# Patient Record
Sex: MALE | Race: AMERICAN INDIAN OR ALASKA NATIVE | HISPANIC OR LATINO | URBAN - METROPOLITAN AREA
[De-identification: names, ages, dates, MRNs, and addresses within clinical notes are randomized per-mention and may not be internally consistent; named-entity substitution may affect disease eponyms.]

---

## 2017-04-17 ENCOUNTER — ALLSCRIPTS OFFICE VISIT (OUTPATIENT)
Dept: OTHER | Facility: OTHER | Age: 47
End: 2017-04-17

## 2017-04-17 DIAGNOSIS — I10 ESSENTIAL (PRIMARY) HYPERTENSION: ICD-10-CM

## 2017-04-17 DIAGNOSIS — E29.1 TESTICULAR HYPOFUNCTION: ICD-10-CM

## 2017-04-17 LAB — HBA1C MFR BLD HPLC: 7.3 %

## 2017-10-06 ENCOUNTER — ALLSCRIPTS OFFICE VISIT (OUTPATIENT)
Dept: OTHER | Facility: OTHER | Age: 47
End: 2017-10-06

## 2017-10-06 DIAGNOSIS — M10.9 GOUT: ICD-10-CM

## 2017-10-06 DIAGNOSIS — E78.00 PURE HYPERCHOLESTEROLEMIA: ICD-10-CM

## 2017-10-06 DIAGNOSIS — E11.9 TYPE 2 DIABETES MELLITUS WITHOUT COMPLICATIONS (HCC): ICD-10-CM

## 2017-10-06 LAB
GLUCOSE SERPL-MCNC: 118 MG/DL
HBA1C MFR BLD HPLC: 7.6 %

## 2017-10-07 NOTE — PROGRESS NOTES
Assessment  1  Diabetes mellitus, type 2 (250 00) (E11 9)   2  Hypertension (401 9) (I10)   3  Testicular hypogonadism (257 2) (E29 1)   4  Morbid obesity with BMI of 60 0-69 9, adult (278 01,V85 44) (B49 36,P58 12)    Plan  Diabetes mellitus, type 2    · (1) BASIC METABOLIC PROFILE; Status:Active; Requested for:06Oct2017;    · (1) URINALYSIS (will reflex a microscopy if leukocytes, occult blood, protein or nitrites are  not within normal limits); Status:Active; Requested for:06Oct2017;    · Blood Glucose- POC; Status:Complete;   Done: 31JGO7121 01:23PM  Fasting (Y/N) : No - N   · Hemoglobin A1c- POC; Status:Complete;   Done: 43SQG3709 01:34PM  Gout    · (1) URIC ACID; Status:Active; Requested for:06Oct2017;   Hypercholesterolemia    · (1) LIPID PANEL, FASTING; Status:Active; Requested FFC:[id number]; Testicular hypogonadism    · AndroGel Pump 20 25 MG/ACT (1 62%) Transdermal Gel; APPLY TWO PUMP  PRESSES ONCE a day    Discussion/Summary    A1C here today up to 7 6 ( was 7 9 10/16, 7 3 4/17) He has not picked up Glucometer yet- will get next few days - drop off /Fax readings in 3-4 weeks  Ctr put back on Metformin- extended release- less GI effect/ tolerating - use as is- did not start Januvia - hold that for now  Consider other med- injectable vs Jennifer Michelle   will see Bariatrics as is- working through process to get surgery / improve diet  BP med as is- BP ok  Testosterone gel - hold off on level as we know runs low- restart med  issues w Gout - stay w Allopurinol  Lipids- discussed statin - wishes to discuss next time  re apnea- requesting travel unit/ get supplies as needed  here 2 months  Chief Complaint  DM, htn f/up   Patient is here today for follow up of chronic conditions described in HPI  History of Present Illness  He is in for a follow-up visit, he has been seeing bariatric specialist, now in nutritional program  Has not been checking sugars, never received glucometer   Stopped testosterone gel as ran out  not start Januvia, relates received extended-release metformin from bariatric specialist  issues with gout      Review of Systems    Constitutional: as noted in HPI,-no fever,-not feeling poorly-and-no chills  Cardiovascular: No complaints of slow heart rate, no fast heart rate, no chest pain, no palpitations, no leg claudication, no lower extremity  Respiratory: No complaints of shortness of breath, no wheezing, no cough, no SOB on exertion, no orthopnea or PND  Gastrointestinal: No complaints of abdominal pain, no constipation, no nausea or vomiting, no diarrhea or bloody stools  Genitourinary: no dysuria  Neurological: no tingling  Psychiatric: no anxiety-and-no depression  Hematologic/Lymphatic: No complaints of swollen glands, no swollen glands in the neck, does not bleed easily, no easy bruising  Active Problems  1  Family history of Colon Cancer (V16 0)   2  Diabetes mellitus, type 2 (250 00) (E11 9)   3  Gout (274 9) (M10 9)   4  Hypercholesterolemia (272 0) (E78 00)   5  Hypertension (401 9) (I10)   6  Nephrolithiasis (592 0) (N20 0)   7  Sleep apnea (780 57) (G47 30)   8  Testicular hypogonadism (257 2) (E29 1)    Past Medical History  1  History of Acute UTI (599 0) (N39 0)   2  History of Lightheadedness (780 4) (R42)    The active problems and past medical history were reviewed and updated today  Surgical History  1  History of Complete Colonoscopy   2  History of Complete Colonoscopy   3  History of Kidney Surgery   4  History of Knee Arthroscopy (Therapeutic)    Family History  Father    1  Family history of Colon Cancer (V16 0)  Brother    2  Family history of Diabetes Mellitus (V18 0)    Social History   · Marital History - Currently    · Never A Smoker   · No alcohol use  The social history was reviewed and updated today  Current Meds   1  Allopurinol 300 MG Oral Tablet; take 1 tablet every day;    Therapy: 75Oww6411 to (15 673425)  Requested for: 64FIM6736; Last   ST:68RRG8393 Ordered   2  AndroGel Pump 20 25 MG/ACT (1 62%) Transdermal Gel; APPLY TWO PUMP PRESSES   ONCE a day; Therapy: 93Uhn6283 to (Evaluate:33Rkp0060); Last Rx:19Abd7438 Ordered   3  Lisinopril-Hydrochlorothiazide 10-12 5 MG Oral Tablet; Take 1 tablet daily; Therapy: 84HTD1273 to (Evaluate:86Jwc2158)  Requested for: 45WAT7057; Last   Rx:04Mar2017 Ordered   4  MetFORMIN HCl ER (OSM) 500 MG Oral Tablet Extended Release 24 Hour; TAKE 1   TABLET ONCE DAILY WITH MEAL; Therapy: (Recorded:06Oct2017) to Recorded   5  Multi-Vitamin TABS; TAKE 1 TABLET DAILY; Therapy: (Recorded:27Lef7635) to Recorded    The medication list was reviewed and updated today  Allergies  1  Augmentin TABS    Vitals  Vital Signs    Recorded: 40QKC1168 01:10PM   Heart Rate 68   Systolic 912   Diastolic 76   Height 5 ft 10 in   Weight 454 lb 1 oz   BMI Calculated 65 15   BSA Calculated 2 96     Physical Exam    Constitutional   General appearance: No acute distress, well appearing and well nourished  morbidly obese  Pulmonary   Auscultation of lungs: Clear to auscultation, equal breath sounds bilaterally, no wheezes, no rales, no rhonci  Cardiovascular   Auscultation of heart: Normal rate and rhythm, normal S1 and S2, without murmurs  -No ankle edema  Results/Data  PHQ-2 Adult Depression Screening 05FBD9268 02:17PM User, Ld     Test Name Result Flag Reference   PHQ-2 Adult Depression Score 0     Over the last two weeks, how often have you been bothered by any of the following problems?   Little interest or pleasure in doing things: Not at all - 0  Feeling down, depressed, or hopeless: Not at all - 0   PHQ-2 Adult Depression Screening Negative       Blood Glucose- POC 07MIW4930 01:23PM Madi Hatch     Test Name Result Flag Reference   Glucose Finger Stick 118         Future Appointments    Date/Time Provider Specialty Site   12/07/2017 02:30 PM Madi Hatch, [address]  FAMILY MEDICINE     Signatures   Electronically signed by : Ana Murry DO; Oct  6 2017  5:29PM EST                       (Author)

## 2017-10-09 ENCOUNTER — APPOINTMENT (OUTPATIENT)
Dept: LAB | Facility: CLINIC | Age: 47
End: 2017-10-09
Payer: COMMERCIAL

## 2017-10-09 ENCOUNTER — TRANSCRIBE ORDERS (OUTPATIENT)
Dept: LAB | Facility: CLINIC | Age: 47
End: 2017-10-09

## 2017-10-09 DIAGNOSIS — E78.00 PURE HYPERCHOLESTEROLEMIA: ICD-10-CM

## 2017-10-09 DIAGNOSIS — M10.9 GOUT: ICD-10-CM

## 2017-10-09 DIAGNOSIS — E11.9 TYPE 2 DIABETES MELLITUS WITHOUT COMPLICATIONS (HCC): ICD-10-CM

## 2017-10-09 LAB
ANION GAP SERPL CALCULATED.3IONS-SCNC: 6 MMOL/L (ref 4–13)
BACTERIA UR QL AUTO: ABNORMAL /HPF
BILIRUB UR QL STRIP: NEGATIVE
BUN SERPL-MCNC: 13 MG/DL (ref 5–25)
CALCIUM SERPL-MCNC: 9 MG/DL (ref 8.3–10.1)
CHLORIDE SERPL-SCNC: 106 MMOL/L (ref 100–108)
CHOLEST SERPL-MCNC: 226 MG/DL (ref 50–200)
CLARITY UR: ABNORMAL
CO2 SERPL-SCNC: 26 MMOL/L (ref 21–32)
COLOR UR: ABNORMAL
CREAT SERPL-MCNC: 0.86 MG/DL (ref 0.6–1.3)
GFR SERPL CREATININE-BSD FRML MDRD: 104 ML/MIN/1.73SQ M
GLUCOSE P FAST SERPL-MCNC: 128 MG/DL (ref 65–99)
GLUCOSE UR STRIP-MCNC: NEGATIVE MG/DL
HDLC SERPL-MCNC: 42 MG/DL (ref 40–60)
HGB UR QL STRIP.AUTO: NEGATIVE
KETONES UR STRIP-MCNC: NEGATIVE MG/DL
LDLC SERPL CALC-MCNC: 128 MG/DL (ref 0–100)
LEUKOCYTE ESTERASE UR QL STRIP: ABNORMAL
MUCOUS THREADS UR QL AUTO: ABNORMAL
NITRITE UR QL STRIP: POSITIVE
NON-SQ EPI CELLS URNS QL MICRO: ABNORMAL /HPF
PH UR STRIP.AUTO: 7 [PH] (ref 4.5–8)
POTASSIUM SERPL-SCNC: 3.9 MMOL/L (ref 3.5–5.3)
PROT UR STRIP-MCNC: ABNORMAL MG/DL
RBC #/AREA URNS AUTO: ABNORMAL /HPF
SODIUM SERPL-SCNC: 138 MMOL/L (ref 136–145)
SP GR UR STRIP.AUTO: 1.02 (ref 1–1.03)
TRIGL SERPL-MCNC: 280 MG/DL
URATE SERPL-MCNC: 7.9 MG/DL (ref 4.2–8)
UROBILINOGEN UR QL STRIP.AUTO: 1 E.U./DL
WBC #/AREA URNS AUTO: ABNORMAL /HPF

## 2017-10-09 PROCEDURE — 80061 LIPID PANEL: CPT

## 2017-10-09 PROCEDURE — 80048 BASIC METABOLIC PNL TOTAL CA: CPT

## 2017-10-09 PROCEDURE — 81001 URINALYSIS AUTO W/SCOPE: CPT

## 2017-10-09 PROCEDURE — 84550 ASSAY OF BLOOD/URIC ACID: CPT

## 2017-10-09 PROCEDURE — 36415 COLL VENOUS BLD VENIPUNCTURE: CPT

## 2017-10-10 ENCOUNTER — GENERIC CONVERSION - ENCOUNTER (OUTPATIENT)
Dept: OTHER | Facility: OTHER | Age: 47
End: 2017-10-10

## 2018-01-09 NOTE — RESULT NOTES
Verified Results  (1) URINALYSIS w URINE C/S REFLEX (will reflex a microscopy if leukocytes, occult blood, or nitrites are not within normal limits) 89DTW4108 02:57PM Mouna Deng     Test Name Result Flag Reference   COLOR Dk Yellow     CLARITY Cloudy     PH UA 7 0  4 5-8 0   LEUKOCYTE ESTERASE UA Large A Negative   NITRITE UA Positive A Negative   PROTEIN UA Trace mg/dl A Negative   GLUCOSE UA Negative mg/dl  Negative   KETONES UA Negative mg/dl  Negative   UROBILINOGEN UA 1 0 E U /dl  0 2, 1 0 E U /dl   BILIRUBIN UA Negative  Negative   BLOOD UA Negative  Negative   SPECIFIC GRAVITY UA 1 021  1 003-1 030   BACTERIA Innumerable /hpf A None Seen, Occasional   EPITHELIAL CELLS Occasional /hpf  None Seen, Occasional   RBC UA None Seen /hpf  None Seen   WBC UA Innumerable /hpf A None Seen   CLINICAL REPORT (Report)     Test:        Urine culture  Specimen Type:   Urine  Specimen Date:   11/11/2016 2:57 PM  Result Date:    11/13/2016 11:20 AM  Result Status:   Final result  Resulting Lab:   [address] [address]            Tel: [phone]      CULTURE                                       ------------------                                   >100,000 cfu/ml Klebsiella pneumoniae      SUSCEPTIBILITY                                   ------------------                                                       Klebsiella pneumoniae  METHOD                 YANIRA  -------------------------------------  -------------------------  AMPICILLIN ($$)             >16 00 ug/ml Resistant  AMPICILLIN + SULBACTAM ($)       <=8/4 ug/ml  Susceptible  AZTREONAM ($$$)             <=8 ug/ml   Susceptible  CEFAZOLIN ($)              <=8 00 ug/ml Susceptible  CIPROFLOXACIN ($)            <=1 00 ug/ml Susceptible  GENTAMICIN ($$)             <=4 ug/ml   Susceptible  LEVOFLOXACIN ($)            <=2 00 ug/ml Susceptible  NITROFURANTOIN             <=32 ug/ml  Susceptible  PIPERACILLIN + TAZOBACTAM ($$$)     <=16 ug/ml  Susceptible  TETRACYCLINE              <=4 ug/ml   Susceptible  TOBRAMYCIN ($)             <=4 ug/ml   Susceptible  TRIMETHOPRIM + SULFAMETHOXAZOLE ($$$)  <=2/38 ug/ml Susceptible

## 2018-01-10 NOTE — PROGRESS NOTES
Assessment    1  Encounter for preventive health examination (V70 0) (Z00 00)    Plan  Diabetes mellitus, type 2    · MetFORMIN HCl - 500 MG Oral Tablet   · Januvia 100 MG Oral Tablet; Take 1 tablet daily   · Glucometer; Status:Complete;   Done: 02IGD1426   · Hemoglobin A1c- POC; Status:Complete - Retrospective By Protocol Authorization;    Done: 49QUT9149 04:49PM  FamHx: Colon Cancer, Health Maintenance    · COLONOSCOPY; Status:Active; Requested for:85Lcm4138;   Hypertension    · (1) BASIC METABOLIC PROFILE; Status:Active; Requested for:38Ptx4851;    · (1) URINALYSIS w URINE C/S REFLEX (will reflex a microscopy if leukocytes, occult  blood, or nitrites are not within normal limits); Status:Active; Requested for:48Qbt8468; Testicular hypogonadism    · From  AndroGel Pump 20 25 MG/ACT (1 62%) Transdermal Gel APPLY TWO  PUMP PRESSES ONCE a day x 4 weeks then 4 daily To AndroGel  Pump 20 25 MG/ACT (1 62%) Transdermal Gel APPLY TWO PUMP PRESSES ONCE a  day   · (1) TESTOSTERONE, FREE (DIRECT) AND TOTAL; Status:Active; Requested  for:78Xdp7100;     Discussion/Summary  Impression: health maintenance visit  Testicular cancer screening: self testicular exam technique was taught  Colorectal cancer screening: had age 37 redo now ( FHx Colon Ca w mom)  He was advised to be evaluated by He relates he did see an eye doctor fairly recently  He is in Weight Mngmnt Program at [facility] ( weight there 461 lbs) and plans future Bariatric consult if does not lose weight - He had been 'playing phone tag' w [facility]- should set up DM Ed w them  Gets to gym twice a week, plans to increase this  Has GI issues w Metformin - only uses few times a week - switch to Januvia  Get home Glucometer - check up to daily fasting ( goal < 100) or 2 hr after eating ( goal < 140/160) Drop off readings 1 month  - A1C today has dropped from 7 9 to 7 3  Stay w BP med as is    S/p tx UTI- redo UA - appears passed stone  Also - has been using Testost gel 4 x a week - redo BW Adjust as needed  Future statin    Recheck here 3-4 months Await readings 1 month       Chief Complaint  Physical      History of Present Illness  HPI: He is in today for a checkup, since I had seen him he did start metformin but is only utilizing it few days a week as it upsets his stomach  He is now exploring bariatric program at [facility], is in weight management program at present, starting to exercise  Did not receive glucometer due to some mixup, has not done diabetic education as was playing phone tag with them  Is using testosterone gel 4 days a week 2 pumps  Review of Systems    Constitutional: as noted in HPI, no fever and no chills  ENT: no earache and no sore throat  Cardiovascular: the heart rate was not slow, no chest pain, the heart rate was not fast, no palpitations and no extremity edema  Respiratory: no shortness of breath and no wheezing  Gastrointestinal: No change in bowel, but no abdominal pain, no nausea, no vomiting and no blood in stools  Genitourinary: Previous treatment UTI, feels passed the stone, but no dysuria  Musculoskeletal: no arthralgias  Integumentary: no skin lesions  Neurological: no headache, no confusion, no dizziness, no convulsions and no fainting  Psychiatric: no anxiety and no depression  Endocrine: no hot flashes  Hematologic/Lymphatic: no swollen glands, no tendency for easy bleeding, no tendency for easy bruising and no swollen glands in the neck  Active Problems    1  Family history of Colon Cancer (V16 0)   2  Diabetes mellitus, type 2 (250 00) (E11 9)   3  Gout (274 9) (M10 9)   4  Hypercholesterolemia (272 0) (E78 00)   5  Hypertension (401 9) (I10)   6  Nephrolithiasis (592 0) (N20 0)   7  Obesity (278 00) (E66 9)   8  Sleep apnea (780 57) (G47 30)   9   Testicular hypogonadism (257 2) (E29 1)    Past Medical History    · History of Acute UTI (599 0) (N39 0)   · History of Lightheadedness (780 4) (R42)    Surgical History    · History of Complete Colonoscopy   · History of Complete Colonoscopy   · History of Kidney Surgery   · History of Knee Arthroscopy (Therapeutic)    Family History  Father    · Family history of Colon Cancer (V16 0)  Brother    · Family history of Diabetes Mellitus (V18 0)    Social History    · Marital History - Currently    · Never A Smoker   · No alcohol use    Current Meds   1  Allopurinol 300 MG Oral Tablet; take 1 tablet every day; Therapy: 94Dtw8559 to ([place])  Requested for: 12AAZ2614; Last   Rx:04Mar2017 Ordered   2  AndroGel Pump 20 25 MG/ACT (1 62%) Transdermal Gel; APPLY TWO PUMP PRESSES   ONCE a day x 4 weeks then 4 daily; Therapy: 11Sep2012 to (Evaluate:12Yjh4459); Last Rx:27May2016 Ordered   3  Lisinopril-Hydrochlorothiazide 10-12 5 MG Oral Tablet; Take 1 tablet daily; Therapy: 00NVT7027 to (Evaluate:87Nhg0490)  Requested for: 23XHI3123; Last   Rx:04Mar2017 Ordered   4  MetFORMIN HCl - 500 MG Oral Tablet; TAKE 1 TABLET DAILY WITH LARGEST MEAL; Therapy: 53EIA9295 to (Evaluate:11Znf2083); Last Rx:27May2016 Ordered   5  Multi-Vitamin TABS; TAKE 1 TABLET DAILY; Therapy: (Recorded:42Juq0087) to Recorded    Allergies    1  Augmentin TABS    Vitals   Recorded: 67Oyl9588 04:00PM   Heart Rate 96   Systolic 097   Diastolic 76   Height 5 ft 10 in   Weight 461 lb    BMI Calculated 66 15   BSA Calculated 2 98     Physical Exam    Constitutional   General appearance: No acute distress, well appearing and well nourished  morbidly obese  Head and Face   Head and face: Normal     Eyes   Conjunctiva and lids: No erythema, swelling or discharge  Ears, Nose, Mouth, and Throat   Oropharynx: Normal with no erythema, edema, exudate or lesions  Neck   Neck: Supple, symmetric, trachea midline, no masses  Thyroid: Normal, no thyromegaly  Pulmonary   Auscultation of lungs: Clear to auscultation      Cardiovascular   Auscultation of heart: Normal rate and rhythm, normal S1 and S2, no murmurs  Carotid pulses: 2+ bilaterally  No ankle edema  Lymphatic   Palpation of lymph nodes in neck: No lymphadenopathy  Musculoskeletal   Gait and station: Normal     Neurologic   Cortical function: Normal mental status  Coordination: Normal finger to nose and heel to shin  Psychiatric   Judgment and insight: Normal     Orientation to person, place and time: Normal     Recent and remote memory: Intact  Mood and affect: Normal        Results/Data  Hemoglobin A1c- POC 06Shy3122 04:49PM Araceli Jamison     Test Name Result Flag Reference   HEMOGLOBIN A1C 7 3         Future Appointments    Date/Time Provider Specialty Site   08/18/2017 09:00 AM Araceli Jamison DO Family Medicine [address] FAM[facility]     Signatures   Electronically signed by : Thais Ramos DO;  Apr 17 2017  5:15PM EST                       (Author)

## 2018-01-12 NOTE — RESULT NOTES
Verified Results  (1) COMPREHENSIVE METABOLIC PANEL 05AKJ2859 73:39LQ [facility] Order Number: [id number]     Test Name Result Flag Reference   GLUCOSE,RANDM 126 mg/dL     If the patient is fasting, the ADA then defines impaired fasting glucose as > 100 mg/dL and diabetes as > or equal to 123 mg/dL  SODIUM 140 mmol/L  136-145   POTASSIUM 4 2 mmol/L  3 5-5 3   CHLORIDE 106 mmol/L  100-108   CARBON DIOXIDE 28 mmol/L  21-32   ANION GAP (CALC) 6 mmol/L  4-13   BLOOD UREA NITROGEN 12 mg/dL  5-25   CREATININE 0 87 mg/dL  0 60-1 30   Standardized to IDMS reference method   CALCIUM 9 0 mg/dL  8 3-10 1   BILI, TOTAL 0 60 mg/dL  0 20-1 00   ALK PHOSPHATAS 101 U/L     ALT (SGPT) 18 U/L  12-78   AST(SGOT) 9 U/L  5-45   ALBUMIN 3 6 g/dL  3 5-5 0   TOTAL PROTEIN 7 6 g/dL  6 4-8 2   eGFR Non-African American      >60 0 ml/min/1 73sq [facility] Disease Education Program recommendations are as follows:  GFR calculation is accurate only with a steady state creatinine  Chronic Kidney disease less than 60 ml/min/1 73 sq  meters  Kidney failure less than 15 ml/min/1 73 sq  meters  (1) HEMOGLOBIN A1C 10Oct2016 12:47PM [facility] Order Number: [id number]     Test Name Result Flag Reference   HEMOGLOBIN A1C 7 9 % H 4 2-6 3   EST  AVG   GLUCOSE 180 mg/dl       (1) CBC/PLT/DIFF 10Oct2016 12:47PM [facility] Order Number: [id number]     Test Name Result Flag Reference   WBC COUNT 8 32 Thousand/uL  4 31-10 16   RBC COUNT 4 68 Million/uL  3 88-5 62   HEMOGLOBIN 13 2 g/dL  12 0-17 0   HEMATOCRIT 40 5 %  36 5-49 3   MCV 87 fL  82-98   MCH 28 2 pg  26 8-34 3   MCHC 32 6 g/dL  31 4-37 4   RDW 14 4 %  11 6-15 1   MPV 11 5 fL  8 9-12 7   PLATELET COUNT 699 Thousands/uL  149-390   nRBC AUTOMATED 0 /100 WBCs     NEUTROPHILS RELATIVE PERCENT 66 %  43-75   LYMPHOCYTES RELATIVE PERCENT 24 %  14-44   MONOCYTES RELATIVE PERCENT 8 %  4-12   EOSINOPHILS RELATIVE PERCENT 2 %  0-6 BASOPHILS RELATIVE PERCENT 0 %  0-1   NEUTROPHILS ABSOLUTE COUNT 5 40 Thousands/?L  1 85-7 62   LYMPHOCYTES ABSOLUTE COUNT 2 00 Thousands/?L  0 60-4 47   MONOCYTES ABSOLUTE COUNT 0 66 Thousand/?L  0 17-1 22   EOSINOPHILS ABSOLUTE COUNT 0 18 Thousand/?L  0 00-0 61   BASOPHILS ABSOLUTE COUNT 0 03 Thousands/?L  0 00-0 10     (1) TESTOSTERONE, FREE (DIRECT) AND TOTAL 10Oct2016 12:47PM Gloria Mata Order Number: [id number]     Test Name Result Flag Reference   FREE TESTOSTERONE, DIRECT 5 8 pg/mL L 6 8 - 21 5   COMMENT Comment     Adult male reference interval is based on a population of lean males  up to 36years old     TESTOSTERONE (TOTAL) 102 ng/dL L 348 - 6866   Performed at:  [facility] [address]  [id number]  : Amber Payan MD, Phone:  [phone]

## 2018-01-12 NOTE — RESULT NOTES
Verified Results  (1) BASIC METABOLIC PROFILE 91FGM5097 10:33AM [facility] Order Number: [id number]     Test Name Result Flag Reference   SODIUM 138 mmol/L  136-145   POTASSIUM 3 9 mmol/L  3 5-5 3   CHLORIDE 106 mmol/L  100-108   CARBON DIOXIDE 26 mmol/L  21-32   ANION GAP (CALC) 6 mmol/L  4-13   BLOOD UREA NITROGEN 13 mg/dL  5-25   CREATININE 0 86 mg/dL  0 60-1 30   Standardized to IDMS reference method   CALCIUM 9 0 mg/dL  8 3-10 1   eGFR 104 ml/min/1 73sq m     National Kidney Disease Education Program recommendations are as follows:  GFR calculation is accurate only with a steady state creatinine  Chronic Kidney disease less than 60 ml/min/1 73 sq  meters  Kidney failure less than 15 ml/min/1 73 sq  meters  GLUCOSE FASTING 128 mg/dL H 65-99   Specimen collection should occur prior to Sulfasalazine administration due to the potential for falsely depressed results  Specimen collection should occur prior to Sulfapyridine administration due to the potential for falsely elevated results       (1) URINALYSIS (will reflex a microscopy if leukocytes, occult blood, protein or nitrites are not within normal limits) 09Oct2017 10:33AM [facility] Order Number: [id number]     Test Name Result Flag Reference   COLOR Dk Yellow     CLARITY Cloudy     SPECIFIC GRAVITY UA 1 025  1 003-1 030   PH UA 7 0  4 5-8 0   LEUKOCYTE ESTERASE UA Moderate A Negative   NITRITE UA Positive A Negative   PROTEIN UA Trace mg/dl A Negative   GLUCOSE UA Negative mg/dl  Negative   KETONES UA Negative mg/dl  Negative   UROBILINOGEN UA 1 0 E U /dl  0 2, 1 0 E U /dl   BILIRUBIN UA Negative  Negative   BLOOD UA Negative  Negative   BACTERIA Innumerable /hpf A None Seen, Occasional   EPITHELIAL CELLS None Seen /hpf  None Seen, Occasional   MUCOUS THREADS Occasional  Occasional, Moderate, Innumerable   RBC UA None Seen /hpf  None Seen, 0-5   WBC UA 20-30 /hpf A None Seen, 0-5, 5-55, 5-65     (1) URIC ACID 09Oct2017 10: 33AM [facility] Order Number: [id number]     Test Name Result Flag Reference   URIC ACID 7 9 mg/dL  4 2-8 0   Specimen collection should occur prior to Metamizole administration due to the potential for falsely depressed results  (1) LIPID PANEL, FASTING 08QMH0164 10:33AM [facility] Order Number: [id number]     Test Name Result Flag Reference   CHOLESTEROL 226 mg/dL H    HDL,DIRECT 42 mg/dL  40-60   Specimen collection should occur prior to Metamizole administration due to the potential for falsley depressed results  LDL CHOLESTEROL CALCULATED 128 mg/dL H 0-100   Triglyceride:        Normal <150 mg/dl   Borderline High 150-199 mg/dl   High 200-499 mg/dl   Very High >499 mg/dl      Cholesterol:       Desirable <200 mg/dl    Borderline High 200-239 mg/dl    High >239 mg/dl      HDL Cholesterol:       High>59 mg/dL    Low <41 mg/dL      This screening LDL is a calculated result  It does not have the accuracy of the Direct Measured LDL in the monitoring of patients with hyperlipidemia and/or statin therapy  Direct Measure LDL (LQX613) must be ordered separately in these patients  TRIGLYCERIDES 280 mg/dL H <=150   Specimen collection should occur prior to N-Acetylcysteine or Metamizole administration due to the potential for falsely depressed results

## 2018-01-14 VITALS
DIASTOLIC BLOOD PRESSURE: 76 MMHG | BODY MASS INDEX: 45.1 KG/M2 | WEIGHT: 315 LBS | SYSTOLIC BLOOD PRESSURE: 134 MMHG | HEIGHT: 70 IN | HEART RATE: 96 BPM

## 2018-01-15 VITALS
WEIGHT: 315 LBS | BODY MASS INDEX: 45.1 KG/M2 | SYSTOLIC BLOOD PRESSURE: 138 MMHG | HEIGHT: 70 IN | DIASTOLIC BLOOD PRESSURE: 76 MMHG | HEART RATE: 68 BPM

## 2018-01-16 NOTE — RESULT NOTES
Verified Results  (1) URIC ACID [id number] 09:51AM Ciashopifer Gram Order Number: [id number]     Test Name Result Flag Reference   URIC ACID 7 5 mg/dL  4 2-8 0     (1) COMPREHENSIVE METABOLIC PANEL [id number] 21:72AQ Ciashopifer Gram Order Number: [id number]      National Kidney Disease Education Program recommendations are as follows:  GFR calculation is accurate only with a steady state creatinine  Chronic Kidney disease less than 60 ml/min/1 73 sq  meters  Kidney failure less than 15 ml/min/1 73 sq  meters  Test Name Result Flag Reference   GLUCOSE,RANDM 128 mg/dL     SODIUM 139 mmol/L  136-145   POTASSIUM 4 0 mmol/L  3 5-5 3   CHLORIDE 103 mmol/L  100-108   CARBON DIOXIDE 31 mmol/L  21-32   ANION GAP (CALC) 5 mmol/L  4-13   BLOOD UREA NITROGEN 14 mg/dL  5-25   CREATININE 0 87 mg/dL  0 60-1 30   Standardized to IDMS reference method   CALCIUM 8 9 mg/dL  8 3-10 1   BILI, TOTAL 0 52 mg/dL  0 20-1 00   ALK PHOSPHATAS 107 U/L     ALT (SGPT) 21 U/L  12-78   AST(SGOT) 11 U/L  5-45   ALBUMIN 3 8 g/dL  3 5-5 0   TOTAL PROTEIN 7 6 g/dL  6 4-8 2   eGFR Non-African American      >60 0 ml/min/1 73sq m     (1) HEMOGLOBIN A1C [id number] 09:51AM Janifer Gram Order Number: [id number]      5 7-6 4% impaired fasting glucose  >=6 5% diagnosis of diabetes    Falsely low levels are seen in conditions linked to short RBC life span-  hemolytic anemia, and splenomegaly  Falsely elevated levels are seen in situations where there is an increased production of RBC- receipt of erythropoietin or blood transfusions  Adopted from ADA-Clinical Practice Recommendations     Test Name Result Flag Reference   HEMOGLOBIN A1C 7 0 % H 4 0-5 6   EST  AVG   GLUCOSE 154 mg/dl       (1) CBC/ PLT (NO DIFF) [id number] 09:51AM Ciashopifer Gram Order Number: [id number]     Order Number: [id number]     Test Name Result Flag Reference   HEMATOCRIT 41 8 %  36 5-49 3   HEMOGLOBIN 13 7 g/dL  12 0-17 0   MCHC 32 8 g/dL  31 4-37 4 MCH 28 6 pg  26 8-34 3   MCV 87 fL  82-98   PLATELET COUNT 918 Thousands/uL  149-390   RBC COUNT 4 79 Million/uL  3 88-5 62   RDW 14 1 %  11 6-15 1   WBC COUNT 7 94 Thousand/uL  4 31-10 16   MPV 11 7 fL  8 9-12 7     (1) LIPID PANEL, FASTING [id number] 09:51AM Areatha Pear Order Number: [id number]      Triglyceride:         Normal              <150 mg/dl       Borderline High    150-199 mg/dl       High               200-499 mg/dl       Very High          >499 mg/dl  Cholesterol:         Desirable        <200 mg/dl      Borderline High  200-239 mg/dl      High             >239 mg/dl  HDL Cholesterol:        High    >59 mg/dL      Low     <41 mg/dL  LDL CALCULATED:    This screening LDL is a calculated result  It does not have the accuracy of the Direct Measured LDL in the monitoring of patients with hyperlipidemia and/or statin therapy  Direct Measure LDL (BBE212) must be ordered separately in these patients  Test Name Result Flag Reference   CHOLESTEROL 242 mg/dL H    HDL,DIRECT 43 mg/dL  40-60   LDL CHOLESTEROL CALCULATED 155 mg/dL H 0-100   TRIGLYCERIDES 220 mg/dL H <=150     (1) TESTOSTERONE, FREE (DIRECT) AND TOTAL [id number] 09:51AM Areatha Pear Order Number: [id number]    Performed at:  [address]  [id number]  : Rosangela Ruiz MD, Phone:  [phone]     Test Name Result Flag Reference   FREE TESTOSTERONE, DIRECT 7 4 pg/mL  6 8 - 21 5   COMMENT Comment     Adult male reference interval is based on a population of lean malesup to 36years old     TESTOSTERONE (TOTAL) 97 ng/dL L 348 - 1197

## 2018-03-05 ENCOUNTER — TRANSCRIBE ORDERS (OUTPATIENT)
Dept: LAB | Facility: CLINIC | Age: 48
End: 2018-03-05

## 2018-03-06 DIAGNOSIS — I10 ESSENTIAL HYPERTENSION: Primary | ICD-10-CM

## 2018-03-06 DIAGNOSIS — M1A.00X0 IDIOPATHIC CHRONIC GOUT WITHOUT TOPHUS, UNSPECIFIED SITE: ICD-10-CM

## 2018-03-06 RX ORDER — ALLOPURINOL 300 MG/1
TABLET ORAL
Qty: 90 TABLET | Refills: 0 | Status: SHIPPED | OUTPATIENT
Start: 2018-03-06 | End: 2018-06-11 | Stop reason: SDUPTHER

## 2018-03-06 RX ORDER — LISINOPRIL AND HYDROCHLOROTHIAZIDE 12.5; 1 MG/1; MG/1
TABLET ORAL
Qty: 90 TABLET | Refills: 0 | Status: SHIPPED | OUTPATIENT
Start: 2018-03-06 | End: 2018-12-28 | Stop reason: SDUPTHER

## 2018-03-15 RX ORDER — TESTOSTERONE 16.2 MG/G
GEL TRANSDERMAL
COMMUNITY
Start: 2012-09-11 | End: 2018-03-16 | Stop reason: SDUPTHER

## 2018-03-15 RX ORDER — METFORMIN HYDROCHLORIDE EXTENDED-RELEASE TABLETS 500 MG/1
TABLET, FILM COATED, EXTENDED RELEASE ORAL
COMMUNITY
End: 2018-03-16 | Stop reason: SDUPTHER

## 2018-03-15 RX ORDER — MULTIVITAMIN
1 TABLET ORAL DAILY
COMMUNITY

## 2018-03-16 ENCOUNTER — OFFICE VISIT (OUTPATIENT)
Dept: FAMILY MEDICINE CLINIC | Facility: CLINIC | Age: 48
End: 2018-03-16
Payer: COMMERCIAL

## 2018-03-16 VITALS
WEIGHT: 315 LBS | OXYGEN SATURATION: 97 % | DIASTOLIC BLOOD PRESSURE: 76 MMHG | BODY MASS INDEX: 42.66 KG/M2 | SYSTOLIC BLOOD PRESSURE: 110 MMHG | HEART RATE: 101 BPM | HEIGHT: 72 IN

## 2018-03-16 DIAGNOSIS — M1A.9XX0 CHRONIC GOUT WITHOUT TOPHUS, UNSPECIFIED CAUSE, UNSPECIFIED SITE: ICD-10-CM

## 2018-03-16 DIAGNOSIS — E11.8 TYPE 2 DIABETES MELLITUS WITH COMPLICATION, UNSPECIFIED LONG TERM INSULIN USE STATUS: Primary | ICD-10-CM

## 2018-03-16 DIAGNOSIS — E29.1 TESTICULAR HYPOGONADISM: ICD-10-CM

## 2018-03-16 DIAGNOSIS — I10 ESSENTIAL HYPERTENSION: ICD-10-CM

## 2018-03-16 DIAGNOSIS — E66.01 MORBID OBESITY WITH BMI OF 60.0-69.9, ADULT (HCC): ICD-10-CM

## 2018-03-16 PROBLEM — Z99.89 OSA ON CPAP: Status: ACTIVE | Noted: 2017-06-14

## 2018-03-16 PROBLEM — G47.33 OSA ON CPAP: Status: ACTIVE | Noted: 2017-06-14

## 2018-03-16 LAB — SL AMB POCT HEMOGLOBIN AIC: 7.4

## 2018-03-16 PROCEDURE — 99214 OFFICE O/P EST MOD 30 MIN: CPT | Performed by: FAMILY MEDICINE

## 2018-03-16 PROCEDURE — 83036 HEMOGLOBIN GLYCOSYLATED A1C: CPT | Performed by: FAMILY MEDICINE

## 2018-03-16 RX ORDER — METFORMIN HYDROCHLORIDE EXTENDED-RELEASE TABLETS 500 MG/1
500 TABLET, FILM COATED, EXTENDED RELEASE ORAL 2 TIMES DAILY WITH MEALS
Qty: 180 TABLET | Refills: 1 | Status: SHIPPED | OUTPATIENT
Start: 2018-03-16 | End: 2018-11-26 | Stop reason: SDUPTHER

## 2018-03-16 RX ORDER — TESTOSTERONE 16.2 MG/G
GEL TRANSDERMAL
Qty: 75 G | Refills: 3 | Status: SHIPPED | OUTPATIENT
Start: 2018-03-16 | End: 2018-08-24 | Stop reason: SDUPTHER

## 2018-03-16 NOTE — LETTER
March 16, 2018     Patient: Johnny Park   YOB: 1970   Date of Visit: 3/16/2018       To Whom it May Concern:    Ken Khan is under my professional care  He was seen in my office on 3/16/2018  If you have any questions or concerns, please don't hesitate to call           Sincerely,          Reford DO Rachel        CC: No Recipients

## 2018-03-16 NOTE — PROGRESS NOTES
FAMILY PRACTICE OFFICE VISIT      Lacy ANDRADE O  [facility]    [address], [facility]      NAME: Carey Carrasquillo  AGE: 53 y o  SEX: male  : 1970   MRN: [id number]    DATE: 3/16/2018  TIME: 12:47 PM    Assessment and Plan     Problem List Items Addressed This Visit        Endocrine    Diabetes mellitus, type 2 ([facility] ) - Primary    Relevant Medications    metFORMIN (FORTAMET) 500 MG (OSM) 24 hr tablet    Dapagliflozin Propanediol (FARXIGA) 5 MG TABS    Other Relevant Orders    POCT hemoglobin A1c (Completed)    HEMOGLOBIN A1C W/ EAG ESTIMATION    Testicular hypogonadism    Relevant Medications    testosterone (ANDROGEL PUMP) 1 62 % TD gel pump    Other Relevant Orders    Testosterone, free, total    CBC       Cardiovascular and Mediastinum    Essential hypertension    Relevant Orders    Comprehensive metabolic panel       Other    Gout    Relevant Orders    Uric acid    Morbid obesity with BMI of 60 0-69 9, adult ([facility] )            Patient Instructions   His blood pressure is under good control here today, I would like him to continue on lisinopril hydrochlorothiazide as is  He just received his glucometer, he will start checking his sugar, A1c done here today is slightly improved at 7 4 but not ideal, he will continue to work at [employer], he is continuing through the bariatric center process, he will be following up with them  We will increase his metformin to twice daily, he does use extended-release  He should see an eye doctor every 1-2 years  He does have some numbness right 5th toe, does not appear related to diabetes but I would like him to see Podiatry  We discussed other meds -  Rx Farxiga -     Declines statin  he will restart testosterone supplementation, -     Redo blood work before returns in 4 months  He has had no issues with gout, continue on allopurinol as is      He did have issue with passing small stone recently, call if increased urinary symptoms, none current  Chief Complaint     Chief Complaint   Patient presents with    Follow-up     DM        History of Present Illness     Asad Mackenzie is a 53y o -year-old male who presents today in follow-up, I had seen him in October  He relates he is feeling about the same, has not followed up with bariatric due to insurance issue but does plan to see them  Has not yet started to check his sugars  Is not currently using testosterone but plans to start    Review of Systems     Review of Systems   Constitutional: Positive for unexpected weight change (Weight remains far too high, he continues to struggle with this)  Negative for appetite change, chills, diaphoresis, fatigue and fever  HENT: Negative for congestion, dental problem, ear pain, hearing loss, mouth sores, sinus pain, sinus pressure, sore throat and trouble swallowing  Eyes: Negative for visual disturbance  Respiratory: Negative for cough, chest tightness and shortness of breath  Cardiovascular: Negative for chest pain, palpitations and leg swelling  Gastrointestinal: Negative for abdominal pain, blood in stool, constipation, diarrhea, nausea and vomiting  Genitourinary: Negative for difficulty urinating, dysuria and hematuria  Musculoskeletal: Negative for arthralgias (No issues with gout), back pain and myalgias  Skin: Negative for rash and wound  Neurological: Negative for dizziness, syncope, weakness, light-headedness and headaches  Hematological: Negative for adenopathy  Does not bruise/bleed easily  Psychiatric/Behavioral: Negative for behavioral problems, confusion and sleep disturbance         Active Problem List     Patient Active Problem List   Diagnosis    Testicular hypogonadism    WALDO on CPAP    Nephrolithiasis    Morbid obesity with BMI of 60 0-69 9, adult ([facility] )    Hypercholesterolemia    Gout    Essential hypertension    Diabetes mellitus, type 2 ([facility] )       Past Medical History:  No past medical history on file  Past Surgical History:  Past Surgical History:   Procedure Laterality Date    COLONOSCOPY      Had neg 2011(redo at age 40)   Larry Lemme KIDNEY SURGERY Right     For kidney stones age 7   Larry Lemme KNEE ARTHROSCOPY Left        Family History:  Family History   Problem Relation Age of Onset    Colon cancer Father     Diabetes Brother        Social History:  Social History     Social History    Marital status: /Civil Union     Spouse name: N/A    Number of children: N/A    Years of education: N/A     Occupational History    Not on file  Social History Main Topics    Smoking status: Never Smoker    Smokeless tobacco: Never Used    Alcohol use No    Drug use: Unknown    Sexual activity: Not on file     Other Topics Concern    Not on file     Social History Narrative    No narrative on file       Objective     Vitals:    03/16/18 0926   BP: 110/76   Pulse: 101   SpO2: 97%   Weight: (!) 199 kg (439 lb 9 6 oz)   Height: 5' 11 5" (1 816 m)     Body mass index is 60 46 kg/m²  BP Readings from Last 3 Encounters:   03/16/18 110/76   10/06/17 138/76   04/17/17 134/76       Wt Readings from Last 3 Encounters:   03/16/18 (!) 199 kg (439 lb 9 6 oz)   10/06/17 (!) 206 kg (454 lb 0 9 oz)   04/17/17 (!) 209 kg (460 lb 15 9 oz)       Physical Exam   Constitutional: He is oriented to person, place, and time  No distress  Pleasant morbidly obese male seated no acute distress   HENT:   Mouth/Throat: Oropharynx is clear and moist    TM clear b/l   Eyes: Conjunctivae are normal  Right eye exhibits no discharge  Left eye exhibits no discharge  No scleral icterus  Neck: Normal range of motion  Neck supple  Carotid bruit is not present  No thyromegaly present  Cardiovascular: Normal rate, regular rhythm and normal heart sounds  No murmur heard    No carotid bruit   Pulmonary/Chest: Effort normal and breath sounds normal  No respiratory distress  He has no wheezes  He has no rales  Abdominal: Soft  There is no tenderness  Musculoskeletal: He exhibits no edema  Lymphadenopathy:     He has no cervical adenopathy  Neurological: He is alert and oriented to person, place, and time  No cranial nerve deficit  Coordination normal    Skin: He is not diaphoretic  Psychiatric: He has a normal mood and affect  His behavior is normal  Judgment and thought content normal    Nursing note and vitals reviewed  Pertinent Laboratory/Diagnostic Studies:  Results for orders placed or performed in visit on 03/16/18   POCT hemoglobin A1c   Result Value Ref Range     HGB A1C 7 4      See Oct BW    ALLERGIES:  Allergies   Allergen Reactions    Augmentin  [Amoxicillin-Pot Clavulanate] Diarrhea       Current Medications     Current Outpatient Prescriptions   Medication Sig Dispense Refill    allopurinol (ZYLOPRIM) 300 mg tablet TAKE 1 TABLET EVERY DAY 90 tablet 0    glucose blood (ONE TOUCH ULTRA TEST) test strip by In Vitro route daily      lisinopril-hydrochlorothiazide (PRINZIDE,ZESTORETIC) 10-12 5 MG per tablet TAKE 1 TABLET DAILY 90 tablet 0    metFORMIN (FORTAMET) 500 MG (OSM) 24 hr tablet Take 1 tablet (500 mg total) by mouth 2 (two) times a day with meals 180 tablet 1    Multiple Vitamin (MULTI-VITAMIN DAILY) TABS Take 1 tablet by mouth daily      testosterone (ANDROGEL PUMP) 1 62 % TD gel pump Use 2 pump presses daily 75 g 3    Dapagliflozin Propanediol (FARXIGA) 5 MG TABS Take 1 tablet (5 mg total) by mouth daily 30 tablet 6     No current facility-administered medications for this visit            Health Maintenance     Orders Placed This Encounter   Procedures    Testosterone, free, total    CBC    Comprehensive metabolic panel    HEMOGLOBIN A1C W/ EAG ESTIMATION    Uric acid    POCT hemoglobin A1c         Genet Solis DO

## 2018-03-16 NOTE — PATIENT INSTRUCTIONS
His blood pressure is under good control here today, I would like him to continue on lisinopril hydrochlorothiazide as is  He just received his glucometer, he will start checking his sugar, A1c done here today is slightly improved at 7 4 but not ideal, he will continue to work at [employer], he is continuing through the bariatric center process, he will be following up with them  We will increase his metformin to twice daily, he does use extended-release  He should see an eye doctor every 1-2 years  He does have some numbness right 5th toe, does not appear related to diabetes but I would like him to see Podiatry  We discussed other meds -  Rx Farxiga -     Declines statin  he will restart testosterone supplementation, -     Redo blood work before returns in 4 months  He has had no issues with gout, continue on allopurinol as is  He did have issue with passing small stone recently, call if increased urinary symptoms, none current

## 2018-06-11 DIAGNOSIS — M1A.00X0 IDIOPATHIC CHRONIC GOUT WITHOUT TOPHUS, UNSPECIFIED SITE: ICD-10-CM

## 2018-06-11 RX ORDER — ALLOPURINOL 300 MG/1
TABLET ORAL
Qty: 90 TABLET | Refills: 1 | Status: SHIPPED | OUTPATIENT
Start: 2018-06-11 | End: 2019-06-15 | Stop reason: SDUPTHER

## 2018-08-24 ENCOUNTER — OFFICE VISIT (OUTPATIENT)
Dept: FAMILY MEDICINE CLINIC | Facility: CLINIC | Age: 48
End: 2018-08-24
Payer: COMMERCIAL

## 2018-08-24 VITALS
BODY MASS INDEX: 42.66 KG/M2 | HEIGHT: 72 IN | DIASTOLIC BLOOD PRESSURE: 76 MMHG | HEART RATE: 90 BPM | WEIGHT: 315 LBS | SYSTOLIC BLOOD PRESSURE: 138 MMHG | OXYGEN SATURATION: 95 %

## 2018-08-24 DIAGNOSIS — E29.1 TESTICULAR HYPOGONADISM: ICD-10-CM

## 2018-08-24 DIAGNOSIS — G47.33 OSA ON CPAP: ICD-10-CM

## 2018-08-24 DIAGNOSIS — E11.65 TYPE 2 DIABETES MELLITUS WITH HYPERGLYCEMIA, WITHOUT LONG-TERM CURRENT USE OF INSULIN (HCC): ICD-10-CM

## 2018-08-24 DIAGNOSIS — M10.9 GOUT, UNSPECIFIED CAUSE, UNSPECIFIED CHRONICITY, UNSPECIFIED SITE: ICD-10-CM

## 2018-08-24 DIAGNOSIS — Z00.00 WELL ADULT HEALTH CHECK: Primary | ICD-10-CM

## 2018-08-24 DIAGNOSIS — Z99.89 OSA ON CPAP: ICD-10-CM

## 2018-08-24 DIAGNOSIS — E78.2 MIXED HYPERLIPIDEMIA: ICD-10-CM

## 2018-08-24 DIAGNOSIS — E66.01 MORBID OBESITY WITH BMI OF 60.0-69.9, ADULT (HCC): ICD-10-CM

## 2018-08-24 DIAGNOSIS — I10 ESSENTIAL HYPERTENSION: ICD-10-CM

## 2018-08-24 PROBLEM — M17.12 OSTEOARTHRITIS OF LEFT KNEE: Status: ACTIVE | Noted: 2018-08-24

## 2018-08-24 LAB — SL AMB POCT HEMOGLOBIN AIC: 7.6

## 2018-08-24 PROCEDURE — 99396 PREV VISIT EST AGE 40-64: CPT | Performed by: FAMILY MEDICINE

## 2018-08-24 PROCEDURE — 83036 HEMOGLOBIN GLYCOSYLATED A1C: CPT | Performed by: FAMILY MEDICINE

## 2018-08-24 RX ORDER — TESTOSTERONE 16.2 MG/G
GEL TRANSDERMAL
Qty: 75 G | Refills: 0 | Status: SHIPPED | OUTPATIENT
Start: 2018-08-24 | End: 2019-09-05 | Stop reason: SDUPTHER

## 2018-08-24 RX ORDER — TESTOSTERONE 16.2 MG/G
GEL TRANSDERMAL
Qty: 75 G | Refills: 1 | Status: SHIPPED | OUTPATIENT
Start: 2018-08-24 | End: 2018-08-24 | Stop reason: SDUPTHER

## 2018-08-24 NOTE — PATIENT INSTRUCTIONS
He is in today for a physical exam/ regular check  He is proceeding along with plan to undergo bariatric sleeve in December with Dr Tammy Goddard  Very good candidate ( wife/ daughter underwent bypass)   He will continue to work at [employer], continue work at [employer] loss in the meantime  Left knee osteoarthritis does limit his exercise, he has been seeing [facility] regarding this, will require future replacement  His sugars are still too high, redo A1c here today is about the same at 7 6, he has not been checking home glucometer readings, try to check at least few times a week for guidance  Has not been using metformin twice daily, he will increase to twice daily with meals  I also want him to increase Farxiga to 10 mg daily  No issues with gout, continue on allopurinol as is, redo uric acid  Had not done blood work after last visit, slip given to redo fasting urinalysis along with blood work here today  He does continue on testosterone supplementation, recently using this about 3 times weekly  Include PSA, CBC with blood work  Blood pressure is acceptable, continue on lisinopril HCT 10/12 5 daily  Immunization History   Administered Date(s) Administered    Influenza 02/15/2016    Influenza Quadrivalent Preservative Free 3 years and older IM 09/26/2014    Influenza Quadrivalent, 6-35 Months IM 02/15/2016    Influenza TIV (IM) 10/11/2013    Tuberculin Skin Test-PPD Intradermal 04/01/2011       He does not do yearly Flu shot  Do in Oct   Tdap/tetanus shot will be done at a future date  (done every 10 yrs for superficial cuts, every 5 yrs for deep wounds)      Was never a smoker     Regarding Colon Cancer screening, we discussed Colonoscopy vs ColoGuard is indicated starting at age 48  last was age 37 -   has FHx Colon Cancer ( mom)  Plans to redo age 49  Discussed Prostate Cancer screening pros/cons starting at age 48    PSA blood test  ordered  Should do monthly testicular exam at home  We did review previous blood work,   He is not up to date with Lipid screening  Not on statin  Wishes to hold off  He is up to date with Diabetes screening       Also - should see Dentist routinely, and also should see Eye Dr if any vision changes      We will see him again in 3-4 months ( before surgery) -  sooner as needed

## 2018-08-24 NOTE — PROGRESS NOTES
FAMILY PRACTICE OFFICE VISIT  [place] ANDRADE O  [facility]  [address], [address]      NAME: Barry Purcell  AGE: 53 y o  SEX: male  : 1970   MRN: [id number]    DATE: 2018  TIME: 12:18 PM    Assessment and Plan     Problem List Items Addressed This Visit        Endocrine    Testicular hypogonadism    Relevant Medications    testosterone (ANDROGEL PUMP) 1 62 % TD gel pump    Other Relevant Orders    POCT hemoglobin A1c (Completed)    Testosterone, free, total    CBC    Comprehensive metabolic panel    TSH, 3rd generation with Free T4 reflex    Urinalysis with microscopic    PSA, Total Screen    Type 2 diabetes mellitus with hyperglycemia, without long-term current use of insulin (HCC)    Relevant Medications    Dapagliflozin Propanediol (FARXIGA) 10 MG TABS    Other Relevant Orders    Comprehensive metabolic panel    Lipid panel    Urinalysis with microscopic       Respiratory    WALDO on CPAP       Cardiovascular and Mediastinum    Essential hypertension    Relevant Orders    Comprehensive metabolic panel    Lipid panel    Urinalysis with microscopic       Other    Gout    Relevant Orders    Uric acid    Mixed hyperlipidemia    Relevant Orders    Comprehensive metabolic panel    Lipid panel    Morbid obesity with BMI of 60 0-69 9, adult (HCC)    Relevant Orders    Comprehensive metabolic panel    Lipid panel    TSH, 3rd generation with Free T4 reflex      Other Visit Diagnoses     Well adult health check    -  Primary          Patient Instructions     He is in today for a physical exam/ regular check  He is proceeding along with plan to undergo bariatric sleeve in December with Dr Izabella Corado  Very good candidate ( wife/ daughter underwent bypass)   He will continue to work at [employer], continue work at [employer] loss in the meantime    Left knee osteoarthritis does limit his exercise, he has been seeing [facility] regarding this, will require future replacement  His sugars are still too high, redo A1c here today is about the same at 7 6, he has not been checking home glucometer readings, try to check at least few times a week for guidance  Has not been using metformin twice daily, he will increase to twice daily with meals  I also want him to increase Farxiga to 10 mg daily  No issues with gout, continue on allopurinol as is, redo uric acid  Had not done blood work after last visit, slip given to redo fasting urinalysis along with blood work here today  He does continue on testosterone supplementation, recently using this about 3 times weekly  Include PSA, CBC with blood work  Blood pressure is acceptable, continue on lisinopril HCT 10/12 5 daily  Immunization History   Administered Date(s) Administered    Influenza 02/15/2016    Influenza Quadrivalent Preservative Free 3 years and older IM 09/26/2014    Influenza Quadrivalent, 6-35 Months IM 02/15/2016    Influenza TIV (IM) 10/11/2013    Tuberculin Skin Test-PPD Intradermal 04/01/2011       He does not do yearly Flu shot  Do in Oct   Tdap/tetanus shot will be done at a future date  (done every 10 yrs for superficial cuts, every 5 yrs for deep wounds)      Was never a smoker     Regarding Colon Cancer screening, we discussed Colonoscopy vs ColoGuard is indicated starting at age 48  last was age 37 -   has FHx Colon Cancer ( mom)  Plans to redo age 49  Discussed Prostate Cancer screening pros/cons starting at age 48  PSA blood test  ordered  Should do monthly testicular exam at home  We did review previous blood work,   He is not up to date with Lipid screening  Not on statin  Wishes to hold off  He is up to date with Diabetes screening       Also - should see Dentist routinely, and also should see Eye Dr if any vision changes      We will see him again in 3-4 months ( before surgery) -  sooner as needed              Chief Complaint     Chief Complaint   Patient presents with    Follow-up       History of Present Illness   Barry Purcell is a 53y o -year-old male who is in for a follow-up visit/regular physical   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  He has not been checking sugars  He is pursuing bariatric gastric sleeve, plans to do in December  He has been using his testosterone 3 days weekly  Has only been using metformin once daily  Review of Systems   Review of Systems   Constitutional: Negative for appetite change, fatigue, fever and unexpected weight change  HENT: Negative for sore throat and trouble swallowing  Eyes: Negative for visual disturbance  Respiratory: Negative for cough, chest tightness and shortness of breath  Cardiovascular: Negative for chest pain, palpitations and leg swelling  Gastrointestinal: Negative for abdominal pain and blood in stool  No acid reflux     No change in bowel   Genitourinary: Negative for dysuria and hematuria  Musculoskeletal: Positive for arthralgias (Seeing orthopedist regarding knee pain)  Neurological: Negative for dizziness, seizures, syncope, light-headedness and headaches  Hematological: Does not bruise/bleed easily  Psychiatric/Behavioral: Negative for behavioral problems and confusion  Active Problem List     Patient Active Problem List   Diagnosis    Testicular hypogonadism    WALDO on CPAP    Nephrolithiasis    Morbid obesity with BMI of 60 0-69 9, adult ([address] )    Mixed hyperlipidemia    Gout    Essential hypertension    Type 2 diabetes mellitus with hyperglycemia, without long-term current use of insulin (HCC)    Osteoarthritis of left knee       Past Medical History:  No past medical history on file      Past Surgical History:  Past Surgical History:   Procedure Laterality Date    COLONOSCOPY      Had neg 2011(redo at age 40)   [place] KIDNEY SURGERY Right     For kidney stones age 7   [place] KNEE ARTHROSCOPY Left        Family History:  Family History   Problem Relation Age of Onset    Colon cancer Father     Diabetes Brother        Social History:  Social History     Social History    Marital status: /Civil Union     Spouse name: N/A    Number of children: N/A    Years of education: N/A     Occupational History    Not on file  Social History Main Topics    Smoking status: Never Smoker    Smokeless tobacco: Never Used    Alcohol use 0 6 oz/week     1 Shots of liquor per week      Comment: weekly     Drug use: No    Sexual activity: Not on file     Other Topics Concern    Not on file     Social History Narrative    No narrative on file       Objective     Vitals:    08/24/18 0926   BP: 138/76   Pulse: 90   SpO2: 95%   Weight: (!) 199 kg (439 lb)   Height: 5' 11 5" (1 816 m)     Body mass index is 60 37 kg/m²  BP Readings from Last 3 Encounters:   08/24/18 138/76   03/16/18 110/76   10/06/17 138/76       Wt Readings from Last 3 Encounters:   08/24/18 (!) 199 kg (439 lb)   03/16/18 (!) 199 kg (439 lb 9 6 oz)   10/06/17 (!) 206 kg (454 lb 0 9 oz)       Physical Exam   Constitutional: He is oriented to person, place, and time  No distress  Pleasant morbidly obese male   HENT:   TM clear b/l   Eyes: Conjunctivae are normal  No scleral icterus  Neck: Neck supple  Carotid bruit is not present  Cardiovascular: Normal rate, regular rhythm and normal heart sounds  No murmur heard  No carotid bruit   Pulmonary/Chest: Effort normal and breath sounds normal  No respiratory distress  He has no wheezes  He has no rales  Abdominal: Soft  There is no tenderness  Musculoskeletal: He exhibits no edema  Lymphadenopathy:     He has no cervical adenopathy  Neurological: He is alert and oriented to person, place, and time  No cranial nerve deficit  Psychiatric: He has a normal mood and affect   His behavior is normal    Nursing note and vitals reviewed  ALLERGIES:  Allergies   Allergen Reactions    Augmentin  [Amoxicillin-Pot Clavulanate] Diarrhea       Current Medications     Current Outpatient Prescriptions   Medication Sig Dispense Refill    allopurinol (ZYLOPRIM) 300 mg tablet TAKE 1 TABLET BY MOUTH EVERY DAY 90 tablet 1    Dapagliflozin Propanediol (FARXIGA) 10 MG TABS Take 1 tablet (10 mg total) by mouth daily 90 tablet 1    glucose blood (ONE TOUCH ULTRA TEST) test strip by In Vitro route daily      lisinopril-hydrochlorothiazide (PRINZIDE,ZESTORETIC) 10-12 5 MG per tablet TAKE 1 TABLET DAILY 90 tablet 0    metFORMIN (FORTAMET) 500 MG (OSM) 24 hr tablet Take 1 tablet (500 mg total) by mouth 2 (two) times a day with meals 180 tablet 1    Multiple Vitamin (MULTI-VITAMIN DAILY) TABS Take 1 tablet by mouth daily      testosterone (ANDROGEL PUMP) 1 62 % TD gel pump Use 2 pump presses daily 75 g 0     No current facility-administered medications for this visit                Most recent labs available from [address] [place] [address]   ( others may be available in [facility] / Media sections)  Lab Results   Component Value Date    WBC 8 32 10/10/2016    HGB 13 2 10/10/2016    HCT 40 5 10/10/2016     10/10/2016    CHOL 208 10/11/2013    TRIG 280 (H) 10/09/2017    HDL 42 10/09/2017    ALT 18 10/10/2016    AST 9 10/10/2016     10/09/2017    K 3 9 10/09/2017     10/09/2017    CREATININE 0 86 10/09/2017    BUN 13 10/09/2017    CO2 26 10/09/2017    GLUF 128 (H) 10/09/2017    HGBA1C 7 6 08/24/2018     Lab Results   Component Value Date    LDLCALC 128 (H) 10/09/2017     Lab Results   Component Value Date    [id number] 2 175 10/11/2013         Orders Placed This Encounter   Procedures    Testosterone, free, total    CBC    Comprehensive metabolic panel    Lipid panel    Uric acid    TSH, 3rd generation with Free T4 reflex    Urinalysis with microscopic    PSA, Total Screen    POCT hemoglobin A1c         Elisabeth Dumont DO

## 2018-08-31 ENCOUNTER — APPOINTMENT (OUTPATIENT)
Dept: LAB | Facility: CLINIC | Age: 48
End: 2018-08-31
Payer: COMMERCIAL

## 2018-08-31 DIAGNOSIS — E29.1 TESTICULAR HYPOGONADISM: ICD-10-CM

## 2018-08-31 DIAGNOSIS — E66.01 MORBID OBESITY (HCC): Primary | ICD-10-CM

## 2018-08-31 DIAGNOSIS — E11.69 DIABETES MELLITUS ASSOCIATED WITH HORMONAL ETIOLOGY (HCC): ICD-10-CM

## 2018-08-31 DIAGNOSIS — I10 ESSENTIAL HYPERTENSION, MALIGNANT: ICD-10-CM

## 2018-08-31 DIAGNOSIS — E66.9 OBESITY, UNSPECIFIED CLASSIFICATION, UNSPECIFIED OBESITY TYPE, UNSPECIFIED WHETHER SERIOUS COMORBIDITY PRESENT: ICD-10-CM

## 2018-08-31 LAB
25(OH)D3 SERPL-MCNC: 12.6 NG/ML (ref 30–100)
ALBUMIN SERPL BCP-MCNC: 3.8 G/DL (ref 3.5–5)
ALP SERPL-CCNC: 96 U/L (ref 46–116)
ALT SERPL W P-5'-P-CCNC: 24 U/L (ref 12–78)
ANION GAP SERPL CALCULATED.3IONS-SCNC: 7 MMOL/L (ref 4–13)
AST SERPL W P-5'-P-CCNC: 16 U/L (ref 5–45)
BACTERIA UR QL AUTO: ABNORMAL /HPF
BASOPHILS # BLD AUTO: 0.03 THOUSANDS/ΜL (ref 0–0.1)
BASOPHILS NFR BLD AUTO: 0 % (ref 0–1)
BILIRUB SERPL-MCNC: 1.04 MG/DL (ref 0.2–1)
BILIRUB UR QL STRIP: NEGATIVE
BUN SERPL-MCNC: 19 MG/DL (ref 5–25)
CALCIUM SERPL-MCNC: 9.1 MG/DL (ref 8.3–10.1)
CHLORIDE SERPL-SCNC: 102 MMOL/L (ref 100–108)
CHOLEST SERPL-MCNC: 231 MG/DL (ref 50–200)
CLARITY UR: ABNORMAL
CO2 SERPL-SCNC: 28 MMOL/L (ref 21–32)
COLOR UR: ABNORMAL
CREAT SERPL-MCNC: 0.83 MG/DL (ref 0.6–1.3)
EOSINOPHIL # BLD AUTO: 0.14 THOUSAND/ΜL (ref 0–0.61)
EOSINOPHIL NFR BLD AUTO: 2 % (ref 0–6)
ERYTHROCYTE [DISTWIDTH] IN BLOOD BY AUTOMATED COUNT: 14.1 % (ref 11.6–15.1)
EST. AVERAGE GLUCOSE BLD GHB EST-MCNC: 160 MG/DL
FERRITIN SERPL-MCNC: 180 NG/ML (ref 8–388)
FOLATE SERPL-MCNC: >20 NG/ML (ref 3.1–17.5)
GFR SERPL CREATININE-BSD FRML MDRD: 105 ML/MIN/1.73SQ M
GLUCOSE P FAST SERPL-MCNC: 118 MG/DL (ref 65–99)
GLUCOSE UR STRIP-MCNC: NEGATIVE MG/DL
HBA1C MFR BLD: 7.2 % (ref 4.2–6.3)
HCT VFR BLD AUTO: 42.4 % (ref 36.5–49.3)
HDLC SERPL-MCNC: 38 MG/DL (ref 40–60)
HGB BLD-MCNC: 13.3 G/DL (ref 12–17)
HGB UR QL STRIP.AUTO: NEGATIVE
HYALINE CASTS #/AREA URNS LPF: ABNORMAL /LPF
IMM GRANULOCYTES # BLD AUTO: 0.04 THOUSAND/UL (ref 0–0.2)
IMM GRANULOCYTES NFR BLD AUTO: 1 % (ref 0–2)
KETONES UR STRIP-MCNC: NEGATIVE MG/DL
LDLC SERPL CALC-MCNC: 154 MG/DL (ref 0–100)
LEUKOCYTE ESTERASE UR QL STRIP: ABNORMAL
LYMPHOCYTES # BLD AUTO: 1.84 THOUSANDS/ΜL (ref 0.6–4.47)
LYMPHOCYTES NFR BLD AUTO: 27 % (ref 14–44)
MCH RBC QN AUTO: 27.8 PG (ref 26.8–34.3)
MCHC RBC AUTO-ENTMCNC: 31.4 G/DL (ref 31.4–37.4)
MCV RBC AUTO: 89 FL (ref 82–98)
MONOCYTES # BLD AUTO: 0.65 THOUSAND/ΜL (ref 0.17–1.22)
MONOCYTES NFR BLD AUTO: 10 % (ref 4–12)
NEUTROPHILS # BLD AUTO: 4.09 THOUSANDS/ΜL (ref 1.85–7.62)
NEUTS SEG NFR BLD AUTO: 60 % (ref 43–75)
NITRITE UR QL STRIP: POSITIVE
NON-SQ EPI CELLS URNS QL MICRO: ABNORMAL /HPF
NONHDLC SERPL-MCNC: 193 MG/DL
NRBC BLD AUTO-RTO: 0 /100 WBCS
PH UR STRIP.AUTO: 6 [PH] (ref 4.5–8)
PLATELET # BLD AUTO: 292 THOUSANDS/UL (ref 149–390)
PMV BLD AUTO: 11.5 FL (ref 8.9–12.7)
POTASSIUM SERPL-SCNC: 3.9 MMOL/L (ref 3.5–5.3)
PROT SERPL-MCNC: 8.3 G/DL (ref 6.4–8.2)
PROT UR STRIP-MCNC: ABNORMAL MG/DL
PSA SERPL-MCNC: 0.7 NG/ML (ref 0–4)
RBC # BLD AUTO: 4.78 MILLION/UL (ref 3.88–5.62)
RBC #/AREA URNS AUTO: ABNORMAL /HPF
SODIUM SERPL-SCNC: 137 MMOL/L (ref 136–145)
SP GR UR STRIP.AUTO: 1.03 (ref 1–1.03)
TIBC SERPL-MCNC: 320 UG/DL (ref 250–450)
TRIGL SERPL-MCNC: 194 MG/DL
TSH SERPL DL<=0.05 MIU/L-ACNC: 1.49 UIU/ML (ref 0.36–3.74)
URATE SERPL-MCNC: 6.5 MG/DL (ref 4.2–8)
UROBILINOGEN UR QL STRIP.AUTO: 1 E.U./DL
VIT B12 SERPL-MCNC: 572 PG/ML (ref 100–900)
WBC # BLD AUTO: 6.79 THOUSAND/UL (ref 4.31–10.16)
WBC #/AREA URNS AUTO: ABNORMAL /HPF

## 2018-08-31 PROCEDURE — 84402 ASSAY OF FREE TESTOSTERONE: CPT

## 2018-08-31 PROCEDURE — 83036 HEMOGLOBIN GLYCOSYLATED A1C: CPT | Performed by: FAMILY MEDICINE

## 2018-08-31 PROCEDURE — 82728 ASSAY OF FERRITIN: CPT

## 2018-08-31 PROCEDURE — G0103 PSA SCREENING: HCPCS

## 2018-08-31 PROCEDURE — 82607 VITAMIN B-12: CPT

## 2018-08-31 PROCEDURE — 84550 ASSAY OF BLOOD/URIC ACID: CPT | Performed by: FAMILY MEDICINE

## 2018-08-31 PROCEDURE — 85025 COMPLETE CBC W/AUTO DIFF WBC: CPT

## 2018-08-31 PROCEDURE — 80053 COMPREHEN METABOLIC PANEL: CPT | Performed by: FAMILY MEDICINE

## 2018-08-31 PROCEDURE — 3045F PR MOST RECENT HEMOGLOBIN A1C LEVEL 7.0-9.0%: CPT | Performed by: FAMILY MEDICINE

## 2018-08-31 PROCEDURE — 80061 LIPID PANEL: CPT | Performed by: FAMILY MEDICINE

## 2018-08-31 PROCEDURE — 82306 VITAMIN D 25 HYDROXY: CPT

## 2018-08-31 PROCEDURE — 84403 ASSAY OF TOTAL TESTOSTERONE: CPT

## 2018-08-31 PROCEDURE — 81001 URINALYSIS AUTO W/SCOPE: CPT | Performed by: FAMILY MEDICINE

## 2018-08-31 PROCEDURE — 82746 ASSAY OF FOLIC ACID SERUM: CPT

## 2018-08-31 PROCEDURE — 36415 COLL VENOUS BLD VENIPUNCTURE: CPT | Performed by: FAMILY MEDICINE

## 2018-08-31 PROCEDURE — 83550 IRON BINDING TEST: CPT

## 2018-08-31 PROCEDURE — 84443 ASSAY THYROID STIM HORMONE: CPT | Performed by: FAMILY MEDICINE

## 2018-09-01 LAB
TESTOST FREE SERPL-MCNC: 5.1 PG/ML (ref 6.8–21.5)
TESTOST SERPL-MCNC: 122 NG/DL (ref 264–916)

## 2018-09-03 RX ORDER — TESTOSTERONE CYPIONATE 200 MG/ML
300 INJECTION INTRAMUSCULAR
COMMUNITY
End: 2018-12-28 | Stop reason: ALTCHOICE

## 2018-09-03 RX ORDER — DAPAGLIFLOZIN 5 MG/1
1 TABLET, FILM COATED ORAL DAILY
COMMUNITY
Start: 2018-09-03 | End: 2018-12-18 | Stop reason: SDUPTHER

## 2018-09-06 PROBLEM — E55.9 VITAMIN D DEFICIENCY: Status: ACTIVE | Noted: 2018-09-06

## 2018-11-26 ENCOUNTER — OFFICE VISIT (OUTPATIENT)
Dept: FAMILY MEDICINE CLINIC | Facility: CLINIC | Age: 48
End: 2018-11-26
Payer: COMMERCIAL

## 2018-11-26 VITALS
HEART RATE: 88 BPM | OXYGEN SATURATION: 97 % | BODY MASS INDEX: 60.37 KG/M2 | DIASTOLIC BLOOD PRESSURE: 76 MMHG | HEIGHT: 72 IN | SYSTOLIC BLOOD PRESSURE: 138 MMHG

## 2018-11-26 DIAGNOSIS — E11.65 TYPE 2 DIABETES MELLITUS WITH HYPERGLYCEMIA, WITHOUT LONG-TERM CURRENT USE OF INSULIN (HCC): ICD-10-CM

## 2018-11-26 DIAGNOSIS — E66.01 MORBID OBESITY WITH BMI OF 60.0-69.9, ADULT (HCC): ICD-10-CM

## 2018-11-26 DIAGNOSIS — Z99.89 OSA ON CPAP: ICD-10-CM

## 2018-11-26 DIAGNOSIS — I10 ESSENTIAL HYPERTENSION: ICD-10-CM

## 2018-11-26 DIAGNOSIS — E29.1 TESTICULAR HYPOGONADISM: ICD-10-CM

## 2018-11-26 DIAGNOSIS — G47.33 OSA ON CPAP: ICD-10-CM

## 2018-11-26 DIAGNOSIS — E78.2 MIXED HYPERLIPIDEMIA: ICD-10-CM

## 2018-11-26 DIAGNOSIS — Z00.00 WELL ADULT HEALTH CHECK: Primary | ICD-10-CM

## 2018-11-26 DIAGNOSIS — M1A.9XX0 CHRONIC GOUT WITHOUT TOPHUS, UNSPECIFIED CAUSE, UNSPECIFIED SITE: ICD-10-CM

## 2018-11-26 PROCEDURE — 99396 PREV VISIT EST AGE 40-64: CPT | Performed by: FAMILY MEDICINE

## 2018-11-26 RX ORDER — ERGOCALCIFEROL 1.25 MG/1
50000 CAPSULE ORAL WEEKLY
Refills: 0 | COMMUNITY
Start: 2018-10-29 | End: 2019-04-21 | Stop reason: ALTCHOICE

## 2018-11-26 RX ORDER — METFORMIN HYDROCHLORIDE EXTENDED-RELEASE TABLETS 500 MG/1
500 TABLET, FILM COATED, EXTENDED RELEASE ORAL
Qty: 90 TABLET | Refills: 0
Start: 2018-11-26 | End: 2018-12-28 | Stop reason: ALTCHOICE

## 2018-11-26 NOTE — PROGRESS NOTES
FAMILY PRACTICE OFFICE VISIT  Laura Sees A  L[place] ANDRADE O  [facility]  [address], [address]      NAME: Asad Mackenzie  AGE: 53 y o  SEX: male  : 1970   MRN: [id number]    DATE: 2018  TIME: 10:23 AM    Assessment and Plan     Problem List Items Addressed This Visit        Unprioritized    Type 2 diabetes mellitus with hyperglycemia, without long-term current use of insulin (HCC)    Relevant Medications    metFORMIN (FORTAMET) 500 MG (OSM) 24 hr tablet    Testicular hypogonadism    WALDO on CPAP    Morbid obesity with BMI of 60 0-69 9, adult ([facility] [address] )    Mixed hyperlipidemia    Gout    Essential hypertension      Other Visit Diagnoses     Well adult health check    -  Primary          Patient Instructions     He is in today for a physical exam/ regular check  Immunization History   Administered Date(s) Administered    Influenza 02/15/2016    Influenza Quadrivalent Preservative Free 3 years and older IM 2014    Influenza Quadrivalent, 6-35 Months IM 02/15/2016    Influenza TIV (IM) 10/11/2013    Tuberculin Skin Test-PPD Intradermal 2011       He does not do yearly Flu shot  Felt reacted to it -   Should reconsider  Tdap/tetanus shot will be done at a future date  (done every 10 yrs for superficial cuts, every 5 yrs for deep wounds)      Was never a smoker     Regarding Colon Cancer screening,     Has fam hx colon cancer ( Dad age 52)     Had negative scope age 37  He should discuss w Surgeon redo scope ( will be doing EGD today )    We did review previous blood work, he will be doing pre-op testing via [address] -   As long as testing is stable he is medically cleared for 18 gastric sleeve at [facility]  He is up to date with Lipid screening  Has declined statin -   Few mo ago Chol 231/38/154    Re diabetes-   Last A1C 7 2   (18)    He will monitor w Bariatrics and we will see him post procedure  Stay on Farxiga 5 mg daily/metformin 500 mg as is ( only on Metformin once daily )    BP acceptable -  stay on lisinopril HCT 10/12 5 daily  Testosterone level back in [facility], he will continue on testosterone pump 2 presses daily, plan to redo testosterone level few months  PSA in August 0 7, urinalysis at that time trace protein, no blood  Past history gout, previous uric acid 6 5, continue on allopurinol 300 mg daily  Stay on vitamin-D weekly via bariatric  We will see him again after procedure  , sooner as needed            Chief Complaint     Chief Complaint   Patient presents with    Follow-up     3-4 month check up        History of Present Illness   Johnny Park is a 53y o -year-old male who is in today for a follow-up visit along with regular physical, he will be undergoing gastric sleeve December 21st with [facility]/Dr Boswell  He has upcoming EGD  Blood work back in August showed vitamin-D low at 12, he is on weekly 50,000  Uses testosterone 2 pumps daily 5 times a week  Last testosterone level 122 back in August     Home sugars = still not checking at home -    Last A1C 7 2 8/31 -   Never increased to 10 mg Farxiga      Review of Systems   Review of Systems   Constitutional: Positive for fatigue  Negative for appetite change, fever and unexpected weight change  HENT: Negative for sore throat and trouble swallowing  Eyes: Negative for visual disturbance  Respiratory: Negative for cough, chest tightness and shortness of breath  Cardiovascular: Negative for chest pain, palpitations and leg swelling  Gastrointestinal: Negative for abdominal pain, blood in stool, nausea and vomiting  No acid reflux     No change in bowel   Genitourinary: Negative for dysuria and hematuria  Skin: Negative for wound  Neurological: Negative for dizziness, syncope, light-headedness and headaches  Hematological: Does not bruise/bleed easily  Psychiatric/Behavioral: Negative for behavioral problems and confusion  Active Problem List     Patient Active Problem List   Diagnosis    Testicular hypogonadism    WALDO on CPAP    Nephrolithiasis    Morbid obesity with BMI of 60 0-69 9, adult ([address] )    Mixed hyperlipidemia    Gout    Essential hypertension    Type 2 diabetes mellitus with hyperglycemia, without long-term current use of insulin (HCC)    Osteoarthritis of left knee    Vitamin D deficiency       Past Medical History:  No past medical history on file  Past Surgical History:  Past Surgical History:   Procedure Laterality Date    COLONOSCOPY      Had neg 2011(redo at age 40)   [place] KIDNEY SURGERY Right     For kidney stones age 7   [place] KNEE ARTHROSCOPY Left        Family History:  Family History   Problem Relation Age of Onset    Colon cancer Father     Diabetes Brother        Social History:  Social History     Social History    Marital status: /Civil Union     Spouse name: N/A    Number of children: N/A    Years of education: N/A     Occupational History    Not on file  Social History Main Topics    Smoking status: Never Smoker    Smokeless tobacco: Never Used    Alcohol use 0 6 oz/week     1 Shots of liquor per week      Comment: weekly     Drug use: No    Sexual activity: Not on file     Other Topics Concern    Not on file     Social History Narrative    No narrative on file       Objective     Vitals:    11/26/18 0747   BP: 138/76   BP Location: Left arm   Patient Position: Standing   Cuff Size: Extra-Large   Pulse: 88   SpO2: 97%   Height: 5' 11 5" (1 816 m)     Body mass index is 60 37 kg/m²      BP Readings from Last 3 Encounters:   11/26/18 138/76   08/24/18 138/76   03/16/18 110/76       Wt Readings from Last 3 Encounters:   08/24/18 (!) 199 kg (439 lb)   03/16/18 (!) 199 kg (439 lb 9 6 oz)   10/06/17 (!) 206 kg (454 lb 0 9 oz)       Physical Exam   Constitutional: He is oriented to person, place, and time  No distress  Morbidly obese   HENT:   TM clear   Eyes: Conjunctivae are normal  No scleral icterus  Neck: Neck supple  Cardiovascular: Normal rate, regular rhythm and normal heart sounds  No murmur heard  No carotid bruit   Pulmonary/Chest: Effort normal and breath sounds normal  No respiratory distress  Abdominal: Soft  There is no tenderness  Musculoskeletal: He exhibits no edema  Lymphadenopathy:     He has no cervical adenopathy  Neurological: He is alert and oriented to person, place, and time  Psychiatric: He has a normal mood and affect  His behavior is normal        ALLERGIES:  Allergies   Allergen Reactions    Augmentin  [Amoxicillin-Pot Clavulanate] Diarrhea    Metformin Diarrhea     Severe diarrhea at low dose but tolerates XR dose       Current Medications     Current Outpatient Prescriptions   Medication Sig Dispense Refill    allopurinol (ZYLOPRIM) 300 mg tablet TAKE 1 TABLET BY MOUTH EVERY DAY 90 tablet 1    ergocalciferol (VITAMIN D2) 50,000 units Take 50,000 Units by mouth once a week  0    glucose blood (ONE TOUCH ULTRA TEST) test strip by In Vitro route daily      lisinopril-hydrochlorothiazide (PRINZIDE,ZESTORETIC) 10-12 5 MG per tablet TAKE 1 TABLET DAILY 90 tablet 0    metFORMIN (FORTAMET) 500 MG (OSM) 24 hr tablet Take 1 tablet (500 mg total) by mouth daily with breakfast 90 tablet 0    Multiple Vitamin (MULTI-VITAMIN DAILY) TABS Take 1 tablet by mouth daily      testosterone (ANDROGEL PUMP) 1 62 % TD gel pump Use 2 pump presses daily 75 g 0    testosterone cypionate (DEPO-TESTOSTERONE) 200 mg/mL SOLN 300 mg Every 14 days      FARXIGA 5 MG TABS 1 tablet daily       No current facility-administered medications for this visit                Most recent labs available from [address] [place] [address]   ( others may be available in [facility] / Media sections)  Lab Results   Component Value Date    WBC 6 79 08/31/2018    HGB 13 3 08/31/2018    HCT 42 4 08/31/2018  08/31/2018    CHOL 208 10/11/2013    TRIG 194 (H) 08/31/2018    HDL 38 (L) 08/31/2018    ALT 24 08/31/2018    AST 16 08/31/2018     10/11/2013    K 3 9 08/31/2018     08/31/2018    CREATININE 0 83 08/31/2018    BUN 19 08/31/2018    CO2 28 08/31/2018    PSA 0 7 08/31/2018    GLUF 118 (H) 08/31/2018    HGBA1C 7 2 (H) 08/31/2018     Lab Results   Component Value Date    LDLCALC 154 (H) 08/31/2018     Lab Results   Component Value Date    [id number] 1 490 08/31/2018         No orders of the defined types were placed in this encounter          Ghanshyam Key DO

## 2018-11-26 NOTE — PATIENT INSTRUCTIONS
He is in today for a physical exam/ regular check  Immunization History   Administered Date(s) Administered    Influenza 02/15/2016    Influenza Quadrivalent Preservative Free 3 years and older IM 09/26/2014    Influenza Quadrivalent, 6-35 Months IM 02/15/2016    Influenza TIV (IM) 10/11/2013    Tuberculin Skin Test-PPD Intradermal 04/01/2011       He does not do yearly Flu shot  Felt reacted to it -   Should reconsider  Tdap/tetanus shot will be done at a future date  (done every 10 yrs for superficial cuts, every 5 yrs for deep wounds)      Was never a smoker     Regarding Colon Cancer screening,     Has fam hx colon cancer ( Dad age 52)     Had negative scope age 37  He should discuss w Surgeon redo scope ( will be doing EGD today )    We did review previous blood work, he will be doing pre-op testing via [address] [place] [address] [address] -   As long as testing is stable he is medically cleared for 12/21/18 gastric sleeve at [facility]  He is up to date with Lipid screening  Has declined statin -   Few mo ago Chol 231/38/154  Re diabetes-   Last A1C 7 2   (8/31/18)    He will monitor w Bariatrics and we will see him post procedure  Stay on Farxiga 5 mg daily/metformin 500 mg as is ( only on Metformin once daily )    BP acceptable -  stay on lisinopril HCT 10/12 5 daily  Testosterone level back in [facility], he will continue on testosterone pump 2 presses daily, plan to redo testosterone level few months  PSA in August 0 7, urinalysis at that time trace protein, no blood  Past history gout, previous uric acid 6 5, continue on allopurinol 300 mg daily  Stay on vitamin-D weekly via bariatric  We will see him again after procedure  , sooner as needed

## 2018-12-18 DIAGNOSIS — E11.65 TYPE 2 DIABETES MELLITUS WITH HYPERGLYCEMIA, WITHOUT LONG-TERM CURRENT USE OF INSULIN (HCC): Primary | ICD-10-CM

## 2018-12-18 RX ORDER — DAPAGLIFLOZIN 5 MG/1
TABLET, FILM COATED ORAL
Qty: 30 TABLET | Refills: 6 | Status: SHIPPED | OUTPATIENT
Start: 2018-12-18 | End: 2018-12-28 | Stop reason: ALTCHOICE

## 2018-12-28 ENCOUNTER — OFFICE VISIT (OUTPATIENT)
Dept: FAMILY MEDICINE CLINIC | Facility: CLINIC | Age: 48
End: 2018-12-28
Payer: COMMERCIAL

## 2018-12-28 VITALS
TEMPERATURE: 97.3 F | HEART RATE: 84 BPM | DIASTOLIC BLOOD PRESSURE: 60 MMHG | OXYGEN SATURATION: 96 % | SYSTOLIC BLOOD PRESSURE: 110 MMHG | HEIGHT: 72 IN | BODY MASS INDEX: 42.66 KG/M2 | WEIGHT: 315 LBS

## 2018-12-28 DIAGNOSIS — Z98.84 S/P LAPAROSCOPIC SLEEVE GASTRECTOMY: ICD-10-CM

## 2018-12-28 DIAGNOSIS — E11.9 TYPE 2 DIABETES MELLITUS WITHOUT COMPLICATION, WITHOUT LONG-TERM CURRENT USE OF INSULIN (HCC): Primary | ICD-10-CM

## 2018-12-28 DIAGNOSIS — I10 ESSENTIAL HYPERTENSION: ICD-10-CM

## 2018-12-28 PROCEDURE — 99495 TRANSJ CARE MGMT MOD F2F 14D: CPT | Performed by: FAMILY MEDICINE

## 2018-12-28 RX ORDER — LISINOPRIL AND HYDROCHLOROTHIAZIDE 12.5; 1 MG/1; MG/1
TABLET ORAL
Qty: 90 TABLET | Refills: 0
Start: 2018-12-28 | End: 2019-04-07 | Stop reason: SDUPTHER

## 2018-12-28 RX ORDER — PANTOPRAZOLE SODIUM 40 MG/1
40 TABLET, DELAYED RELEASE ORAL DAILY
COMMUNITY
Start: 2018-12-21 | End: 2020-03-26 | Stop reason: ALTCHOICE

## 2018-12-28 RX ORDER — OXYCODONE HCL 5 MG/5 ML
SOLUTION, ORAL ORAL
COMMUNITY
Start: 2018-12-22 | End: 2018-12-28 | Stop reason: ALTCHOICE

## 2018-12-28 NOTE — PROGRESS NOTES
FAMILY PRACTICE OFFICE VISIT  [facility]  [address], [address]      NAME: Tanja White  AGE: 53 y o  SEX: male  : 1970   MRN: [id number]    DATE: 2018  TIME: 11:59 AM    Assessment and Plan     Problem List Items Addressed This Visit        Unprioritized    Essential hypertension     He will decrease lisinopril HCT 1012 0 5 to 1/2 daily, may need to discontinue, watch for lightheadedness  Relevant Medications    lisinopril-hydrochlorothiazide (PRINZIDE,ZESTORETIC) 10-12 5 MG per tablet    S/P laparoscopic sleeve gastrectomy    Type 2 diabetes mellitus without complication, without long-term current use of insulin ([facility]) - Primary     Nonfasting glucometer here today 108, is down 18 lb since  bariatric sleeve, stay off Farxiga/metformin  Patient Instructions   Nonfasting glucometer here today 108, is down 18 lb since  bariatric sleeve, stay off Farxiga/metformin  He will restart testosterone 1 62% 2 pumps daily  He will decrease lisinopril HCT 1012 0 5 to 1/2 daily, may need to discontinue, watch for lightheadedness  He will be seeing bariatric again    We will see him again in 3 to 4 months  I would also like him to call me with an update regarding blood pressure readings after his visit with bariatric  Chief Complaint     Chief Complaint   Patient presents with    Follow-up     Post op Gastric sleeve        History of Present Illness   Tanja White is a 53y o -year-old male who is in today for re-evaluation after laparoscopic sleeve  at [facility], I had seen him preop  He is doing very well, he did see his bariatric surgeon this week in follow-up, is slowly advancing diet, increasing activity  Has dropped 18 lb already  Regarding medication he is off metformin, off [place]    Does continue on lisinopril HCT No dizziness or lightheadedness  Review of Systems   Review of Systems   Constitutional: Negative for chills and fever  HENT: Negative for sore throat and trouble swallowing  Eyes: Negative for visual disturbance  Respiratory: Negative for cough, chest tightness and shortness of breath  Cardiovascular: Negative for chest pain, palpitations and leg swelling  Gastrointestinal: Negative for abdominal pain, blood in stool, nausea and vomiting  No acid reflux     No change in bowel   Genitourinary: Negative for dysuria and hematuria  Neurological: Negative for dizziness, light-headedness and headaches  Psychiatric/Behavioral: Negative for behavioral problems  Active Problem List     Patient Active Problem List   Diagnosis    Testicular hypogonadism    WALDO on CPAP    Nephrolithiasis    Morbid obesity with BMI of 60 0-69 9, adult ([facility] )    Mixed hyperlipidemia    Gout    Essential hypertension    Type 2 diabetes mellitus without complication, without long-term current use of insulin (HCC)    Osteoarthritis of left knee    Vitamin D deficiency    S/P laparoscopic sleeve gastrectomy       Past Medical History:  No past medical history on file  Past Surgical History:  Past Surgical History:   Procedure Laterality Date    COLONOSCOPY      Had neg 2011(redo at age 40)   [employer] KIDNEY SURGERY Right     For kidney stones age 7   [employer] KNEE ARTHROSCOPY Left        Family History:  Family History   Problem Relation Age of Onset    Colon cancer Father     Diabetes Brother        Social History:  Social History     Social History    Marital status: /Civil Union     Spouse name: N/A    Number of children: N/A    Years of education: N/A     Occupational History    Not on file  Social History Main Topics    Smoking status: Never Smoker    Smokeless tobacco: Never Used    Alcohol use 0 6 oz/week     1 Shots of liquor per week      Comment: weekly     Drug use:  No  Sexual activity: Not on file     Other Topics Concern    Not on file     Social History Narrative    No narrative on file       Objective     Vitals:    12/28/18 0949   BP: 110/60   BP Location: Left arm   Patient Position: Sitting   Cuff Size: Extra-Large   Pulse: 84   Temp: (!) 97 3 °F (36 3 °C)   SpO2: 96%   Weight: (!) 191 kg (420 lb 3 2 oz)   Height: 5' 11 5" (1 816 m)     Body mass index is 57 79 kg/m²  BP Readings from Last 3 Encounters:   12/28/18 110/60   11/26/18 138/76   08/24/18 138/76       Wt Readings from Last 3 Encounters:   12/28/18 (!) 191 kg (420 lb 3 2 oz)   08/24/18 (!) 199 kg (439 lb)   03/16/18 (!) 199 kg (439 lb 9 6 oz)       Physical Exam   Constitutional:   Pleasant obese male seated no acute distress   Eyes: Conjunctivae are normal  No scleral icterus  Cardiovascular: Normal rate, regular rhythm and normal heart sounds  No murmur heard  Pulmonary/Chest: Effort normal and breath sounds normal  No respiratory distress  He has no wheezes  He has no rales  Abdominal: Soft  There is no tenderness  There is no guarding  Musculoskeletal: He exhibits no edema  No calf tenderness   Lymphadenopathy:     He has no cervical adenopathy  Psychiatric: He has a normal mood and affect   His behavior is normal        ALLERGIES:  Allergies   Allergen Reactions    Augmentin  [Amoxicillin-Pot Clavulanate] Diarrhea    Metformin Diarrhea     Severe diarrhea at low dose but tolerates XR dose       Current Medications     Current Outpatient Prescriptions   Medication Sig Dispense Refill    allopurinol (ZYLOPRIM) 300 mg tablet TAKE 1 TABLET BY MOUTH EVERY DAY 90 tablet 1    lisinopril-hydrochlorothiazide (PRINZIDE,ZESTORETIC) 10-12 5 MG per tablet Use 1/2 daily 90 tablet 0    Multiple Vitamin (MULTI-VITAMIN DAILY) TABS Take 1 tablet by mouth daily      pantoprazole (PROTONIX) 40 mg tablet Take 40 mg by mouth daily Postop via bariatrics       testosterone (ANDROGEL PUMP) 1 62 % TD gel pump Use 2 pump presses daily 75 g 0    ergocalciferol (VITAMIN D2) 50,000 units Take 50,000 Units by mouth once a week  0    glucose blood (ONE TOUCH ULTRA TEST) test strip by In Vitro route daily       No current facility-administered medications for this visit  Most recent labs available from [address] [place] [address]   ( others may be available in Care Everywhere / Media sections)  Lab Results   Component Value Date    WBC 6 79 08/31/2018    HGB 13 3 08/31/2018    HCT 42 4 08/31/2018     08/31/2018    CHOL 208 10/11/2013    TRIG 194 (H) 08/31/2018    HDL 38 (L) 08/31/2018    ALT 24 08/31/2018    AST 16 08/31/2018     10/11/2013    K 3 9 08/31/2018     08/31/2018    CREATININE 0 83 08/31/2018    BUN 19 08/31/2018    CO2 28 08/31/2018    PSA 0 7 08/31/2018    GLUF 118 (H) 08/31/2018    HGBA1C 7 2 (H) 08/31/2018     Lab Results   Component Value Date    LDLCALC 154 (H) 08/31/2018     Lab Results   Component Value Date    TOZ6UXVVTPLW 1 490 08/31/2018         No orders of the defined types were placed in this encounter          America Carrillo DO

## 2018-12-28 NOTE — ASSESSMENT & PLAN NOTE
Nonfasting glucometer here today 108, is down 18 lb since December 21st bariatric sleeve, stay off Farxiga/metformin

## 2018-12-28 NOTE — ASSESSMENT & PLAN NOTE
He will decrease lisinopril HCT 10/12 0 5 to 1/2 daily, may need to discontinue, watch for lightheadedness

## 2018-12-28 NOTE — PATIENT INSTRUCTIONS
Nonfasting glucometer here today 108, is down 18 lb since December 21st bariatric sleeve, stay off Farxiga/metformin  He will restart testosterone 1 62% 2 pumps daily  He will decrease lisinopril HCT 10/12 0 5 to 1/2 daily, may need to discontinue, watch for lightheadedness  He will be seeing bariatric again January 21st   We will see him again in 3 to 4 months  I would also like him to call me with an update regarding blood pressure readings after his visit with bariatric

## 2019-04-07 DIAGNOSIS — I10 ESSENTIAL HYPERTENSION: ICD-10-CM

## 2019-04-07 RX ORDER — LISINOPRIL AND HYDROCHLOROTHIAZIDE 12.5; 1 MG/1; MG/1
0.5 TABLET ORAL DAILY
Qty: 45 TABLET | Refills: 1 | Status: SHIPPED | OUTPATIENT
Start: 2019-04-07 | End: 2019-04-11 | Stop reason: SDUPTHER

## 2019-04-11 DIAGNOSIS — I10 ESSENTIAL HYPERTENSION: ICD-10-CM

## 2019-04-11 RX ORDER — LISINOPRIL AND HYDROCHLOROTHIAZIDE 12.5; 1 MG/1; MG/1
0.5 TABLET ORAL DAILY
Qty: 45 TABLET | Refills: 1 | Status: SHIPPED | OUTPATIENT
Start: 2019-04-11 | End: 2019-08-09 | Stop reason: ALTCHOICE

## 2019-06-15 DIAGNOSIS — M1A.00X0 IDIOPATHIC CHRONIC GOUT WITHOUT TOPHUS, UNSPECIFIED SITE: ICD-10-CM

## 2019-06-15 RX ORDER — ALLOPURINOL 300 MG/1
TABLET ORAL
Qty: 90 TABLET | Refills: 3 | Status: SHIPPED | OUTPATIENT
Start: 2019-06-15 | End: 2020-09-11 | Stop reason: SDUPTHER

## 2019-07-02 ENCOUNTER — TELEPHONE (OUTPATIENT)
Dept: FAMILY MEDICINE CLINIC | Facility: CLINIC | Age: 49
End: 2019-07-02

## 2019-07-02 NOTE — TELEPHONE ENCOUNTER
----- Message from Essie Gutierrez sent at 6/26/2019  1:41 PM EDT -----  Regarding: RE: Schedule  Pt has appt w/ dr Ninoska Bloom on 8/15  ----- Message -----  From: Yonny Nuñez MA  Sent: 6/13/2019  11:54 AM EDT  To: Essie Gutierrez  Subject: Schedule                                         Please attempt to call and schedule pt for PE/DM check  Pt no showed last appt   Overdue for a DM check up and labs

## 2019-07-26 ENCOUNTER — TRANSCRIBE ORDERS (OUTPATIENT)
Dept: LAB | Facility: CLINIC | Age: 49
End: 2019-07-26

## 2019-08-14 ENCOUNTER — TRANSCRIBE ORDERS (OUTPATIENT)
Dept: LAB | Facility: CLINIC | Age: 49
End: 2019-08-14

## 2019-08-14 ENCOUNTER — APPOINTMENT (OUTPATIENT)
Dept: LAB | Facility: CLINIC | Age: 49
End: 2019-08-14
Payer: COMMERCIAL

## 2019-08-14 DIAGNOSIS — Z98.84 BARIATRIC SURGERY STATUS: ICD-10-CM

## 2019-08-14 DIAGNOSIS — E55.9 AVITAMINOSIS D: ICD-10-CM

## 2019-08-14 DIAGNOSIS — Z09 FOLLOW-UP EXAMINATION, FOLLOWING OTHER SURGERY: ICD-10-CM

## 2019-08-14 DIAGNOSIS — Z09 FOLLOW-UP EXAMINATION, FOLLOWING OTHER SURGERY: Primary | ICD-10-CM

## 2019-08-14 DIAGNOSIS — K90.2 POSTOPERATIVE BLIND LOOP SYNDROME: ICD-10-CM

## 2019-08-14 DIAGNOSIS — Z90.3 ACQUIRED ABSENCE OF ORGAN, STOMACH: ICD-10-CM

## 2019-08-14 LAB
25(OH)D3 SERPL-MCNC: 27.8 NG/ML (ref 30–100)
ALBUMIN SERPL BCP-MCNC: 4.2 G/DL (ref 3.5–5)
ALP SERPL-CCNC: 99 U/L (ref 46–116)
ALT SERPL W P-5'-P-CCNC: 26 U/L (ref 12–78)
ANION GAP SERPL CALCULATED.3IONS-SCNC: 6 MMOL/L (ref 4–13)
AST SERPL W P-5'-P-CCNC: 18 U/L (ref 5–45)
BASOPHILS # BLD AUTO: 0.03 THOUSANDS/ΜL (ref 0–0.1)
BASOPHILS NFR BLD AUTO: 1 % (ref 0–1)
BILIRUB SERPL-MCNC: 1.02 MG/DL (ref 0.2–1)
BUN SERPL-MCNC: 15 MG/DL (ref 5–25)
CALCIUM SERPL-MCNC: 9.3 MG/DL (ref 8.3–10.1)
CHLORIDE SERPL-SCNC: 109 MMOL/L (ref 100–108)
CO2 SERPL-SCNC: 31 MMOL/L (ref 21–32)
CREAT SERPL-MCNC: 0.84 MG/DL (ref 0.6–1.3)
EOSINOPHIL # BLD AUTO: 0.13 THOUSAND/ΜL (ref 0–0.61)
EOSINOPHIL NFR BLD AUTO: 2 % (ref 0–6)
ERYTHROCYTE [DISTWIDTH] IN BLOOD BY AUTOMATED COUNT: 14.3 % (ref 11.6–15.1)
FERRITIN SERPL-MCNC: 124 NG/ML (ref 8–388)
FOLATE SERPL-MCNC: 10.1 NG/ML (ref 3.1–17.5)
GFR SERPL CREATININE-BSD FRML MDRD: 104 ML/MIN/1.73SQ M
GLUCOSE P FAST SERPL-MCNC: 93 MG/DL (ref 65–99)
HCT VFR BLD AUTO: 42.6 % (ref 36.5–49.3)
HGB BLD-MCNC: 13.3 G/DL (ref 12–17)
IMM GRANULOCYTES # BLD AUTO: 0.02 THOUSAND/UL (ref 0–0.2)
IMM GRANULOCYTES NFR BLD AUTO: 0 % (ref 0–2)
IRON SERPL-MCNC: 70 UG/DL (ref 65–175)
LYMPHOCYTES # BLD AUTO: 1.38 THOUSANDS/ΜL (ref 0.6–4.47)
LYMPHOCYTES NFR BLD AUTO: 23 % (ref 14–44)
MCH RBC QN AUTO: 28.2 PG (ref 26.8–34.3)
MCHC RBC AUTO-ENTMCNC: 31.2 G/DL (ref 31.4–37.4)
MCV RBC AUTO: 90 FL (ref 82–98)
MONOCYTES # BLD AUTO: 0.53 THOUSAND/ΜL (ref 0.17–1.22)
MONOCYTES NFR BLD AUTO: 9 % (ref 4–12)
NEUTROPHILS # BLD AUTO: 4.02 THOUSANDS/ΜL (ref 1.85–7.62)
NEUTS SEG NFR BLD AUTO: 65 % (ref 43–75)
NRBC BLD AUTO-RTO: 0 /100 WBCS
PLATELET # BLD AUTO: 239 THOUSANDS/UL (ref 149–390)
PMV BLD AUTO: 11.9 FL (ref 8.9–12.7)
POTASSIUM SERPL-SCNC: 4 MMOL/L (ref 3.5–5.3)
PROT SERPL-MCNC: 7.5 G/DL (ref 6.4–8.2)
PTH-INTACT SERPL-MCNC: 59.1 PG/ML (ref 18.4–80.1)
RBC # BLD AUTO: 4.72 MILLION/UL (ref 3.88–5.62)
SODIUM SERPL-SCNC: 146 MMOL/L (ref 136–145)
VIT B12 SERPL-MCNC: 1110 PG/ML (ref 100–900)
WBC # BLD AUTO: 6.11 THOUSAND/UL (ref 4.31–10.16)

## 2019-08-14 PROCEDURE — 36415 COLL VENOUS BLD VENIPUNCTURE: CPT

## 2019-08-14 PROCEDURE — 84590 ASSAY OF VITAMIN A: CPT

## 2019-08-14 PROCEDURE — 80053 COMPREHEN METABOLIC PANEL: CPT

## 2019-08-14 PROCEDURE — 82728 ASSAY OF FERRITIN: CPT

## 2019-08-14 PROCEDURE — 82306 VITAMIN D 25 HYDROXY: CPT

## 2019-08-14 PROCEDURE — 85025 COMPLETE CBC W/AUTO DIFF WBC: CPT

## 2019-08-14 PROCEDURE — 82746 ASSAY OF FOLIC ACID SERUM: CPT

## 2019-08-14 PROCEDURE — 83540 ASSAY OF IRON: CPT

## 2019-08-14 PROCEDURE — 82607 VITAMIN B-12: CPT

## 2019-08-14 PROCEDURE — 84630 ASSAY OF ZINC: CPT

## 2019-08-14 PROCEDURE — 83970 ASSAY OF PARATHORMONE: CPT

## 2019-08-14 PROCEDURE — 84425 ASSAY OF VITAMIN B-1: CPT

## 2019-08-16 LAB — ZINC SERPL-MCNC: 88 UG/DL (ref 56–134)

## 2019-08-17 LAB
VIT A SERPL-MCNC: 47.7 UG/DL (ref 20.1–62)
VIT B1 BLD-SCNC: 96.4 NMOL/L (ref 66.5–200)

## 2019-09-05 ENCOUNTER — OFFICE VISIT (OUTPATIENT)
Dept: FAMILY MEDICINE CLINIC | Facility: CLINIC | Age: 49
End: 2019-09-05
Payer: COMMERCIAL

## 2019-09-05 VITALS
BODY MASS INDEX: 42.66 KG/M2 | HEIGHT: 72 IN | DIASTOLIC BLOOD PRESSURE: 76 MMHG | OXYGEN SATURATION: 96 % | WEIGHT: 315 LBS | SYSTOLIC BLOOD PRESSURE: 128 MMHG | HEART RATE: 73 BPM

## 2019-09-05 DIAGNOSIS — Z98.84 S/P LAPAROSCOPIC SLEEVE GASTRECTOMY: ICD-10-CM

## 2019-09-05 DIAGNOSIS — E66.01 MORBID OBESITY WITH BMI OF 50.0-59.9, ADULT (HCC): ICD-10-CM

## 2019-09-05 DIAGNOSIS — Z86.39 HISTORY OF DIABETES MELLITUS: ICD-10-CM

## 2019-09-05 DIAGNOSIS — E78.2 MIXED HYPERLIPIDEMIA: ICD-10-CM

## 2019-09-05 DIAGNOSIS — Z12.5 SCREENING PSA (PROSTATE SPECIFIC ANTIGEN): ICD-10-CM

## 2019-09-05 DIAGNOSIS — E29.1 TESTICULAR HYPOGONADISM: Primary | ICD-10-CM

## 2019-09-05 DIAGNOSIS — Z86.79 HISTORY OF HYPERTENSION: ICD-10-CM

## 2019-09-05 DIAGNOSIS — M1A.9XX0 CHRONIC GOUT WITHOUT TOPHUS, UNSPECIFIED CAUSE, UNSPECIFIED SITE: ICD-10-CM

## 2019-09-05 PROBLEM — I10 ESSENTIAL HYPERTENSION: Status: RESOLVED | Noted: 2017-06-14 | Resolved: 2019-09-05

## 2019-09-05 LAB — SL AMB POCT HEMOGLOBIN AIC: 5.7 (ref ?–6.5)

## 2019-09-05 PROCEDURE — 3044F HG A1C LEVEL LT 7.0%: CPT | Performed by: FAMILY MEDICINE

## 2019-09-05 PROCEDURE — 99214 OFFICE O/P EST MOD 30 MIN: CPT | Performed by: FAMILY MEDICINE

## 2019-09-05 PROCEDURE — 3008F BODY MASS INDEX DOCD: CPT | Performed by: FAMILY MEDICINE

## 2019-09-05 PROCEDURE — 83036 HEMOGLOBIN GLYCOSYLATED A1C: CPT | Performed by: FAMILY MEDICINE

## 2019-09-05 RX ORDER — MELATONIN
1000 DAILY
COMMUNITY
End: 2020-09-17 | Stop reason: SDUPTHER

## 2019-09-05 RX ORDER — TESTOSTERONE 16.2 MG/G
GEL TRANSDERMAL
Qty: 75 G | Refills: 2 | Status: SHIPPED | OUTPATIENT
Start: 2019-09-05 | End: 2020-09-11 | Stop reason: SDUPTHER

## 2019-09-05 RX ORDER — LANOLIN ALCOHOL/MO/W.PET/CERES
1000 CREAM (GRAM) TOPICAL DAILY
COMMUNITY

## 2019-09-05 NOTE — PROGRESS NOTES
FAMILY PRACTICE OFFICE VISIT  [facility]  [address], [address]      NAME: Tesha Rod  AGE: 51 y o  SEX: male  : 1970   MRN: [id number]    DATE: 2019  TIME: 4:21 PM    Assessment and Plan     Problem List Items Addressed This Visit        Endocrine    Testicular hypogonadism - Primary    Relevant Medications    testosterone (ANDROGEL PUMP) 1 62 % TD gel pump    Other Relevant Orders    PSA, Total Screen    Testosterone, free, total    CBC       Other    Gout    Relevant Orders    Uric acid    History of diabetes mellitus    Relevant Orders    POCT hemoglobin A1c (Completed)    History of hypertension-resolved with bariatric procedure    Mixed hyperlipidemia    Relevant Orders    Lipid panel    Comprehensive metabolic panel    Morbid obesity with BMI of 50 0-59 9, adult ([facility] )    Relevant Orders    POCT hemoglobin A1c (Completed)    S/P laparoscopic sleeve gastrectomy      Other Visit Diagnoses     Screening PSA (prostate specific antigen)        Relevant Orders    PSA, Total Screen          Patient Instructions   He continues to do well after 2018 bariatric procedure/sleeve  He will continue to see bariatric group  Blood pressure is fine today off medication, recent glucose 93, redo A1c here today 5 7  He will be restarting testosterone, I did give slip to redo blood work in December with other bariatric blood work, no last PSA 1 year ago 0 7  Has had no issues with gout, continues on allopurinol 300 mg daily, include uric acid with blood work  Also will redo fasting lipids      Recheck here 6 mo w PE      Chief Complaint     Chief Complaint   Patient presents with    Follow-up       History of Present Illness   Tesha Rod is a 51y o -year-old male who is in today for a follow-up visit, he had undergone gastric sleeve back in December, had dropped 67 lb as of his visit last month with bariatric group, was 436 lb preop  Glucose was 93 back in August    Regarding testosterone  -   Has not used as was not avail at [address] to restart      Review of Systems   Review of Systems   Constitutional: Positive for fatigue and unexpected weight change  Negative for appetite change and fever  HENT: Negative for sore throat and trouble swallowing  Respiratory: Negative for cough, chest tightness and shortness of breath  Cardiovascular: Negative for chest pain, palpitations and leg swelling  Gastrointestinal: Negative for abdominal pain, blood in stool, nausea and vomiting  Occ softer   No melena     No GERd-   Is on PPI   Genitourinary: Negative for dysuria and hematuria  Neurological: Negative for dizziness, syncope, light-headedness and headaches  Hematological: Does not bruise/bleed easily  Psychiatric/Behavioral: Negative for behavioral problems and confusion  Active Problem List     Patient Active Problem List   Diagnosis    Testicular hypogonadism    WALDO on CPAP    Nephrolithiasis    Morbid obesity with BMI of 50 0-59 9, adult ([facility] )    Mixed hyperlipidemia    Gout    History of diabetes mellitus    Osteoarthritis of left knee    Vitamin D deficiency    S/P laparoscopic sleeve gastrectomy    History of hypertension-resolved with bariatric procedure       Past Medical History:  Reviewed    Past Surgical History:  Reviewed    Family History:  Reviewed    Social History:  Reviewed    Objective     Vitals:    09/05/19 1529   BP: 128/76   BP Location: Left arm   Patient Position: Sitting   Cuff Size: Large   Pulse: 73   SpO2: 96%   Weight: (!) 169 kg (372 lb 9 6 oz)   Height: 5' 11 5" (1 816 m)     Body mass index is 51 24 kg/m²      BP Readings from Last 3 Encounters:   09/05/19 128/76   12/28/18 110/60   11/26/18 138/76       Wt Readings from Last 3 Encounters:   09/05/19 (!) 169 kg (372 lb 9 6 oz)   12/28/18 (!) 191 kg (420 lb 3 2 oz) 08/24/18 (!) 199 kg (439 lb)       Physical Exam   Constitutional: He is oriented to person, place, and time  No distress  Pleasant obese male   HENT:   TM clear   Eyes: No scleral icterus  Cardiovascular: Normal rate, regular rhythm and normal heart sounds  No murmur heard  No carotid bruit   Pulmonary/Chest: Effort normal and breath sounds normal  No respiratory distress  Abdominal: Soft  There is no tenderness  Musculoskeletal: He exhibits no edema  Lymphadenopathy:     He has no cervical adenopathy  Neurological: He is alert and oriented to person, place, and time  Psychiatric: He has a normal mood and affect  His behavior is normal        ALLERGIES:  Allergies   Allergen Reactions    Augmentin  [Amoxicillin-Pot Clavulanate] Diarrhea    Metformin Diarrhea     Severe diarrhea at low dose but tolerates XR dose       Current Medications     Current Outpatient Medications   Medication Sig Dispense Refill    allopurinol (ZYLOPRIM) 300 mg tablet TAKE 1 TABLET BY MOUTH EVERY DAY 90 tablet 3    B Complex Vitamins (VITAMIN B COMPLEX PO) Take 1 tablet by mouth      Calcium Carb-Cholecalciferol (CALCIUM CARBONATE-VITAMIN D3 PO) Take 1 tablet by mouth      cholecalciferol (VITAMIN D3) 1,000 units tablet Take 1,000 Units by mouth daily      Coenzyme Q10 (COQ10 PO) Take by mouth every other day Liquid      Multiple Vitamin (MULTI-VITAMIN DAILY) TABS Take 1 tablet by mouth daily      pantoprazole (PROTONIX) 40 mg tablet Take 40 mg by mouth daily Postop via bariatrics       testosterone (ANDROGEL PUMP) 1 62 % TD gel pump Use 2 pump presses daily 75 g 2    vitamin B-12 (VITAMIN B-12) 1,000 mcg tablet Take 1,000 mcg by mouth daily       No current facility-administered medications for this visit               Orders Placed This Encounter   Procedures    PSA, Total Screen    Testosterone, free, total    Uric acid    Lipid panel    CBC    Comprehensive metabolic panel    POCT hemoglobin A1c Juan Orozco, DO

## 2019-09-05 NOTE — PATIENT INSTRUCTIONS
He continues to do well after December 2018 bariatric procedure/sleeve  He will continue to see bariatric group  Blood pressure is fine today off medication, recent glucose 93, redo A1c here today 5 7  He will be restarting testosterone, I did give slip to redo blood work in December with other bariatric blood work, no last PSA 1 year ago 0 7  Has had no issues with gout, continues on allopurinol 300 mg daily, include uric acid with blood work  Also will redo fasting lipids      Recheck here 6 mo w PE

## 2020-03-26 ENCOUNTER — TELEMEDICINE (OUTPATIENT)
Dept: FAMILY MEDICINE CLINIC | Facility: CLINIC | Age: 50
End: 2020-03-26
Payer: COMMERCIAL

## 2020-03-26 VITALS — BODY MASS INDEX: 49.23 KG/M2 | WEIGHT: 315 LBS

## 2020-03-26 DIAGNOSIS — E78.2 MIXED HYPERLIPIDEMIA: ICD-10-CM

## 2020-03-26 DIAGNOSIS — Z86.79 HISTORY OF HYPERTENSION: ICD-10-CM

## 2020-03-26 DIAGNOSIS — G47.33 OSA ON CPAP: ICD-10-CM

## 2020-03-26 DIAGNOSIS — Z99.89 OSA ON CPAP: ICD-10-CM

## 2020-03-26 DIAGNOSIS — Z86.39 HISTORY OF DIABETES MELLITUS: ICD-10-CM

## 2020-03-26 DIAGNOSIS — E29.1 TESTICULAR HYPOGONADISM: Primary | ICD-10-CM

## 2020-03-26 DIAGNOSIS — Z98.84 S/P LAPAROSCOPIC SLEEVE GASTRECTOMY: ICD-10-CM

## 2020-03-26 DIAGNOSIS — M1A.9XX0 CHRONIC GOUT WITHOUT TOPHUS, UNSPECIFIED CAUSE, UNSPECIFIED SITE: ICD-10-CM

## 2020-03-26 PROCEDURE — 99214 OFFICE O/P EST MOD 30 MIN: CPT | Performed by: FAMILY MEDICINE

## 2020-03-26 NOTE — PROGRESS NOTES
Virtual Regular Visit    Problem List Items Addressed This Visit        Endocrine    Testicular hypogonadism - Primary       Respiratory    WALDO on CPAP       Other    Gout    History of diabetes mellitus    History of hypertension-resolved with bariatric procedure    Mixed hyperlipidemia    S/P laparoscopic sleeve gastrectomy        Patient Instructions     He appears to be doing well, continue to work at [employer] / weight loss, exercise as tolerated  He has dropped over 100 lb since his December 2018 surgery  He will contact bariatric regarding follow-up in August, should see them at least yearly  They may want to see him every 6 months  Continue on vitamins as is  He should do blood work, if he does not have slip he will call us  I ordered that in September  He will discuss re-doing colonoscopy with his bariatric group, he did have  Colonoscopy 2011 w/ LOS/ Dr Melo Lopez -   Has family history colon cancer/ father  Needs redo colonoscopy  He will continue to monitor blood pressure on occasion at work  We will see him again in 6 months, call us sooner if needed      Subjective  J Carlos Gibbs is a 53 y o  male I had last seen back in September, he relates he has been feeling well, he had seen his bariatric group back in August, is status post December 2018 bariatric surgery  Initial weight 464 lb max, currently at home he relates he is 358 lb, was 372 lb back in September  He is trying to walk for exercise, does have some knee issues, plans future total knee replacement  Remains off blood pressure medication, 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  History diabetes, A1c 5 7 back in September  He is not using testosterone gel, had an issue obtaining it  He did not do blood work yet that was ordered in September  He is using his CPAP unit, had mask refitted, he feels he is sleeping well        He is off PPI, no GI complaints, occasionally loose BM which does not bother him  Continues on allopurinol, 1 episode gout the past year  BMI Counseling: There is no height or weight on file to calculate BMI  The BMI is above normal  Nutrition recommendations include encouraging healthy choices of fruits and vegetables and moderation in carbohydrate intake  Exercise recommendations include exercising 3-5 times per week  Sees Bariatrics -  S/p Dec 2018 Sx          Encounter provider Tuan Mcdaniel DO    Provider located at [address] [place] [address] Dr Galeano [phone]      Recent Visits  No visits were found meeting these conditions  Showing recent visits within past 7 days and meeting all other requirements     Today's Visits  Date Type Provider Dept   03/26/20 Telemedicine Tuan Mcdaniel DO [facility]   Showing today's visits and meeting all other requirements     Future Appointments  No visits were found meeting these conditions  Showing future appointments within next 150 days and meeting all other requirements        After connecting through [employer], the patient was identified by name and date of birth  Peg Jordan was informed that this is a telemedicine visit and that the visit is being conducted through [employer] which may not be secure and therefore, might not be HIPAA-compliant  My office door was closed  No one else was in the room  He acknowledged consent and understanding of privacy and security of the video platform  The patient has agreed to participate and understands they can discontinue the visit at any time        Past Medical History:   Diagnosis Date    Essential hypertension 6/14/2017       Past Surgical History:   Procedure Laterality Date    COLONOSCOPY      Had neg 2011(redo at age 40)   [facility] KIDNEY SURGERY Right     For kidney stones age 7   [facility] KNEE ARTHROSCOPY Left        Current Outpatient Medications   Medication Sig Dispense Refill    allopurinol (ZYLOPRIM) 300 mg tablet TAKE 1 TABLET BY MOUTH EVERY DAY 90 tablet 3    B Complex Vitamins (VITAMIN B COMPLEX PO) Take 1 tablet by mouth      Calcium Carb-Cholecalciferol (CALCIUM CARBONATE-VITAMIN D3 PO) Take 1 tablet by mouth      cholecalciferol (VITAMIN D3) 1,000 units tablet Take 1,000 Units by mouth daily      Coenzyme Q10 (COQ10 PO) Take by mouth every other day Liquid      Multiple Vitamin (MULTI-VITAMIN DAILY) TABS Take 1 tablet by mouth daily      vitamin B-12 (VITAMIN B-12) 1,000 mcg tablet Take 1,000 mcg by mouth daily      testosterone (ANDROGEL PUMP) 1 62 % TD gel pump Use 2 pump presses daily 75 g 2     No current facility-administered medications for this visit  Allergies   Allergen Reactions    Augmentin  [Amoxicillin-Pot Clavulanate] Diarrhea    Metformin Diarrhea     Severe diarrhea at low dose but tolerates XR dose       Review of Systems   Constitutional: Negative for appetite change, fatigue, fever and unexpected weight change  HENT: Negative for sore throat and trouble swallowing  Respiratory: Negative for cough, chest tightness and shortness of breath  Cardiovascular: Negative for chest pain, palpitations and leg swelling  Gastrointestinal: Negative for abdominal pain and blood in stool  No acid reflux     No change in bowel   Genitourinary: Negative for dysuria and hematuria  Neurological: Negative for dizziness, syncope, weakness, light-headedness and headaches  Hematological: Does not bruise/bleed easily  Psychiatric/Behavioral: Negative for behavioral problems and confusion  Wt Readings from Last 3 Encounters:   03/26/20 (!) 162 kg (358 lb)   09/05/19 (!) 169 kg (372 lb 9 6 oz)   12/28/18 (!) 191 kg (420 lb 3 2 oz)     Physical Exam   Constitutional: He is oriented to person, place, and time  He looks well, has no respiratory distress, no chest pain  Neurological: He is alert and oriented to person, place, and time     Psychiatric: He has a normal mood and affect  His behavior is normal         I spent 15 minutes with the patient during this visit

## 2020-03-26 NOTE — PATIENT INSTRUCTIONS
He appears to be doing well, continue to work at [employer] / weight loss, exercise as tolerated  He has dropped over 100 lb since his December 2018 surgery  He will contact bariatric regarding follow-up in August, should see them at least yearly  They may want to see him every 6 months  Continue on vitamins as is  He should do blood work, if he does not have slip he will call us  I ordered that in September  He will discuss re-doing colonoscopy with his bariatric group, he did have  Colonoscopy 2011 w/ LOS/ Dr Shayy Mendez -   Has family history colon cancer/ father  Needs redo colonoscopy  He will continue to monitor blood pressure on occasion at work      We will see him again in 6 months, call us sooner if needed

## 2020-09-11 ENCOUNTER — TELEMEDICINE (OUTPATIENT)
Dept: FAMILY MEDICINE CLINIC | Facility: CLINIC | Age: 50
End: 2020-09-11
Payer: COMMERCIAL

## 2020-09-11 DIAGNOSIS — M1A.00X0 IDIOPATHIC CHRONIC GOUT WITHOUT TOPHUS, UNSPECIFIED SITE: ICD-10-CM

## 2020-09-11 DIAGNOSIS — Z20.828 EXPOSURE TO SARS-ASSOCIATED CORONAVIRUS: Primary | ICD-10-CM

## 2020-09-11 DIAGNOSIS — E29.1 TESTICULAR HYPOGONADISM: ICD-10-CM

## 2020-09-11 DIAGNOSIS — Z20.828 EXPOSURE TO SARS-ASSOCIATED CORONAVIRUS: ICD-10-CM

## 2020-09-11 PROCEDURE — 1036F TOBACCO NON-USER: CPT | Performed by: INTERNAL MEDICINE

## 2020-09-11 PROCEDURE — U0003 INFECTIOUS AGENT DETECTION BY NUCLEIC ACID (DNA OR RNA); SEVERE ACUTE RESPIRATORY SYNDROME CORONAVIRUS 2 (SARS-COV-2) (CORONAVIRUS DISEASE [COVID-19]), AMPLIFIED PROBE TECHNIQUE, MAKING USE OF HIGH THROUGHPUT TECHNOLOGIES AS DESCRIBED BY CMS-2020-01-R: HCPCS | Performed by: INTERNAL MEDICINE

## 2020-09-11 PROCEDURE — 99213 OFFICE O/P EST LOW 20 MIN: CPT | Performed by: INTERNAL MEDICINE

## 2020-09-11 RX ORDER — TESTOSTERONE 16.2 MG/G
GEL TRANSDERMAL
Qty: 75 G | Refills: 2 | Status: SHIPPED | OUTPATIENT
Start: 2020-09-11 | End: 2021-04-15 | Stop reason: SDUPTHER

## 2020-09-11 RX ORDER — ALLOPURINOL 300 MG/1
300 TABLET ORAL DAILY
Qty: 90 TABLET | Refills: 3 | Status: SHIPPED | OUTPATIENT
Start: 2020-09-11 | End: 2021-10-26

## 2020-09-11 NOTE — PROGRESS NOTES
COVID-19 Virtual Visit     Assessment/Plan:    Problem List Items Addressed This Visit     None        This virtual check-in was done via uniRow  Disposition:      I referred Moni Turner to one of our centralized sites for a COVID-19 swab    I spent 15 minutes directly with the patient during this visit    Encounter provider Peyton Canales MD    Provider located at [address] [place] [id number]    Recent Visits  No visits were found meeting these conditions  Showing recent visits within past 7 days and meeting all other requirements     Today's Visits  Date Type Provider Dept   09/11/20 Russ Chun MD [place] [address] [facility]   Showing today's visits and meeting all other requirements     Future Appointments  No visits were found meeting these conditions  Showing future appointments within next 150 days and meeting all other requirements        Patient agrees to participate in a virtual check in via telephone or video visit instead of presenting to the office to address urgent/immediate medical needs  Patient is aware this is a billable service  After connecting through televideo, the patient was identified by name and date of birth  Holly Ricci was informed that this was a telemedicine visit and that the exam was being conducted confidentially over secure lines  My office door was closed  No one else was in the room  Holly Ricci acknowledged consent and understanding of privacy and security of the telemedicine visit  I informed the patient that I have reviewed his record in Epic and presented the opportunity for him to ask any questions regarding the visit today  The patient agreed to participate  Subjective  Holly Ricci is a 53 y o  male who is concerned about COVID-19  He reports chills and cough   He has not experienced shortness of breath, sore throat, myalgias, abdominal pain, diarrhea, nausea and vomiting He has not had contact with a person who is under investigation for or who is positive for COVID-19 within the last 14 days  He has not been hospitalized recently for fever and/or lower respiratory symptoms  Past Medical History:   Diagnosis Date    Essential hypertension 6/14/2017       Past Surgical History:   Procedure Laterality Date    COLONOSCOPY      Had neg 2011(redo at age 40)   [facility] KIDNEY SURGERY Right     For kidney stones age 7   24 [facility] KNEE ARTHROSCOPY Left        Current Outpatient Medications   Medication Sig Dispense Refill    allopurinol (ZYLOPRIM) 300 mg tablet TAKE 1 TABLET BY MOUTH EVERY DAY 90 tablet 3    B Complex Vitamins (VITAMIN B COMPLEX PO) Take 1 tablet by mouth      Calcium Carb-Cholecalciferol (CALCIUM CARBONATE-VITAMIN D3 PO) Take 1 tablet by mouth      cholecalciferol (VITAMIN D3) 1,000 units tablet Take 1,000 Units by mouth daily      Coenzyme Q10 (COQ10 PO) Take by mouth every other day Liquid      Multiple Vitamin (MULTI-VITAMIN DAILY) TABS Take 1 tablet by mouth daily      testosterone (ANDROGEL PUMP) 1 62 % TD gel pump Use 2 pump presses daily 75 g 2    vitamin B-12 (VITAMIN B-12) 1,000 mcg tablet Take 1,000 mcg by mouth daily       No current facility-administered medications for this visit  Allergies   Allergen Reactions    Augmentin  [Amoxicillin-Pot Clavulanate] Diarrhea    Metformin Diarrhea     Severe diarrhea at low dose but tolerates XR dose       Review of Systems    Video Exam    There were no vitals filed for this visit  Rupal Ricardo appears no distress  Physical Exam     VIRTUAL VISIT DISCLAIMER    Tiburcio Armond acknowledges that he has consented to an online visit or consultation   He understands that the online visit is based solely on information provided by him, and that, in the absence of a face-to-face physical evaluation by the physician, the diagnosis he receives is both limited and provisional in terms of accuracy and completeness  This is not intended to replace a full medical face-to-face evaluation by the physician  Mala Main understands and accepts these terms

## 2020-09-12 DIAGNOSIS — E29.1 TESTICULAR HYPOGONADISM: ICD-10-CM

## 2020-09-12 DIAGNOSIS — M1A.00X0 IDIOPATHIC CHRONIC GOUT WITHOUT TOPHUS, UNSPECIFIED SITE: ICD-10-CM

## 2020-09-12 RX ORDER — ALLOPURINOL 300 MG/1
300 TABLET ORAL DAILY
Qty: 90 TABLET | Refills: 0 | OUTPATIENT
Start: 2020-09-12

## 2020-09-12 RX ORDER — TESTOSTERONE 16.2 MG/G
GEL TRANSDERMAL
Qty: 75 G | Refills: 0 | OUTPATIENT
Start: 2020-09-12

## 2020-09-13 LAB — SARS-COV-2 RNA SPEC QL NAA+PROBE: NOT DETECTED

## 2020-09-14 DIAGNOSIS — Z12.5 SCREENING PSA (PROSTATE SPECIFIC ANTIGEN): ICD-10-CM

## 2020-09-14 DIAGNOSIS — M1A.00X0 IDIOPATHIC CHRONIC GOUT WITHOUT TOPHUS, UNSPECIFIED SITE: ICD-10-CM

## 2020-09-14 DIAGNOSIS — E78.2 MIXED HYPERLIPIDEMIA: Primary | ICD-10-CM

## 2020-09-14 DIAGNOSIS — Z98.84 S/P LAPAROSCOPIC SLEEVE GASTRECTOMY: ICD-10-CM

## 2020-09-14 DIAGNOSIS — E55.9 VITAMIN D DEFICIENCY: ICD-10-CM

## 2020-09-14 DIAGNOSIS — E29.1 TESTICULAR HYPOGONADISM: ICD-10-CM

## 2020-09-14 DIAGNOSIS — Z86.39 HISTORY OF DIABETES MELLITUS: ICD-10-CM

## 2020-09-14 RX ORDER — ALLOPURINOL 300 MG/1
300 TABLET ORAL DAILY
Qty: 90 TABLET | Refills: 3 | Status: CANCELLED | OUTPATIENT
Start: 2020-09-14

## 2020-09-14 RX ORDER — TESTOSTERONE 16.2 MG/G
GEL TRANSDERMAL
Qty: 75 G | Refills: 2 | Status: CANCELLED | OUTPATIENT
Start: 2020-09-14

## 2020-09-16 ENCOUNTER — APPOINTMENT (OUTPATIENT)
Dept: LAB | Facility: CLINIC | Age: 50
End: 2020-09-16
Payer: COMMERCIAL

## 2020-09-16 DIAGNOSIS — Z12.5 SCREENING PSA (PROSTATE SPECIFIC ANTIGEN): ICD-10-CM

## 2020-09-16 DIAGNOSIS — E29.1 TESTICULAR HYPOGONADISM: ICD-10-CM

## 2020-09-16 LAB
25(OH)D3 SERPL-MCNC: 22.4 NG/ML (ref 30–100)
ALBUMIN SERPL BCP-MCNC: 3.5 G/DL (ref 3.5–5)
ALP SERPL-CCNC: 101 U/L (ref 46–116)
ALT SERPL W P-5'-P-CCNC: 42 U/L (ref 12–78)
ANION GAP SERPL CALCULATED.3IONS-SCNC: 7 MMOL/L (ref 4–13)
AST SERPL W P-5'-P-CCNC: 16 U/L (ref 5–45)
BILIRUB SERPL-MCNC: 1.04 MG/DL (ref 0.2–1)
BUN SERPL-MCNC: 13 MG/DL (ref 5–25)
CALCIUM SERPL-MCNC: 9 MG/DL (ref 8.3–10.1)
CHLORIDE SERPL-SCNC: 108 MMOL/L (ref 100–108)
CHOLEST SERPL-MCNC: 217 MG/DL (ref 50–200)
CO2 SERPL-SCNC: 28 MMOL/L (ref 21–32)
CREAT SERPL-MCNC: 0.77 MG/DL (ref 0.6–1.3)
ERYTHROCYTE [DISTWIDTH] IN BLOOD BY AUTOMATED COUNT: 13.5 % (ref 11.6–15.1)
EST. AVERAGE GLUCOSE BLD GHB EST-MCNC: 131 MG/DL
GFR SERPL CREATININE-BSD FRML MDRD: 107 ML/MIN/1.73SQ M
GLUCOSE P FAST SERPL-MCNC: 100 MG/DL (ref 65–99)
HBA1C MFR BLD: 6.2 %
HCT VFR BLD AUTO: 41.7 % (ref 36.5–49.3)
HDLC SERPL-MCNC: 38 MG/DL
HGB BLD-MCNC: 13.4 G/DL (ref 12–17)
LDLC SERPL CALC-MCNC: 144 MG/DL (ref 0–100)
MCH RBC QN AUTO: 28.9 PG (ref 26.8–34.3)
MCHC RBC AUTO-ENTMCNC: 32.1 G/DL (ref 31.4–37.4)
MCV RBC AUTO: 90 FL (ref 82–98)
NONHDLC SERPL-MCNC: 179 MG/DL
PLATELET # BLD AUTO: 276 THOUSANDS/UL (ref 149–390)
PMV BLD AUTO: 11 FL (ref 8.9–12.7)
POTASSIUM SERPL-SCNC: 4.1 MMOL/L (ref 3.5–5.3)
PROT SERPL-MCNC: 7 G/DL (ref 6.4–8.2)
PSA SERPL-MCNC: 0.8 NG/ML (ref 0–4)
RBC # BLD AUTO: 4.63 MILLION/UL (ref 3.88–5.62)
SODIUM SERPL-SCNC: 143 MMOL/L (ref 136–145)
TRIGL SERPL-MCNC: 175 MG/DL
URATE SERPL-MCNC: 7.6 MG/DL (ref 4.2–8)
WBC # BLD AUTO: 8.95 THOUSAND/UL (ref 4.31–10.16)

## 2020-09-16 PROCEDURE — G0103 PSA SCREENING: HCPCS

## 2020-09-16 PROCEDURE — 84402 ASSAY OF FREE TESTOSTERONE: CPT

## 2020-09-16 PROCEDURE — 84550 ASSAY OF BLOOD/URIC ACID: CPT | Performed by: FAMILY MEDICINE

## 2020-09-16 PROCEDURE — 84403 ASSAY OF TOTAL TESTOSTERONE: CPT

## 2020-09-16 PROCEDURE — 80061 LIPID PANEL: CPT | Performed by: FAMILY MEDICINE

## 2020-09-16 PROCEDURE — 82306 VITAMIN D 25 HYDROXY: CPT

## 2020-09-16 PROCEDURE — 83036 HEMOGLOBIN GLYCOSYLATED A1C: CPT

## 2020-09-16 PROCEDURE — 3044F HG A1C LEVEL LT 7.0%: CPT | Performed by: INTERNAL MEDICINE

## 2020-09-16 PROCEDURE — 36415 COLL VENOUS BLD VENIPUNCTURE: CPT | Performed by: FAMILY MEDICINE

## 2020-09-16 PROCEDURE — 80053 COMPREHEN METABOLIC PANEL: CPT | Performed by: FAMILY MEDICINE

## 2020-09-16 PROCEDURE — 85027 COMPLETE CBC AUTOMATED: CPT | Performed by: FAMILY MEDICINE

## 2020-09-17 DIAGNOSIS — E55.9 VITAMIN D DEFICIENCY: Primary | ICD-10-CM

## 2020-09-17 LAB
TESTOST FREE SERPL-MCNC: 6.6 PG/ML (ref 6.8–21.5)
TESTOST SERPL-MCNC: 186 NG/DL (ref 264–916)

## 2020-09-17 RX ORDER — CHOLECALCIFEROL (VITAMIN D3) 50 MCG
2000 TABLET ORAL DAILY
Qty: 30 TABLET | Refills: 11
Start: 2020-09-17

## 2020-10-23 ENCOUNTER — OFFICE VISIT (OUTPATIENT)
Dept: FAMILY MEDICINE CLINIC | Facility: CLINIC | Age: 50
End: 2020-10-23
Payer: COMMERCIAL

## 2020-10-23 VITALS
BODY MASS INDEX: 44.1 KG/M2 | WEIGHT: 315 LBS | HEIGHT: 71 IN | OXYGEN SATURATION: 94 % | HEART RATE: 77 BPM | SYSTOLIC BLOOD PRESSURE: 130 MMHG | TEMPERATURE: 97.8 F | DIASTOLIC BLOOD PRESSURE: 86 MMHG

## 2020-10-23 DIAGNOSIS — R73.9 HYPERGLYCEMIA: ICD-10-CM

## 2020-10-23 DIAGNOSIS — M1A.9XX0 CHRONIC GOUT WITHOUT TOPHUS, UNSPECIFIED CAUSE, UNSPECIFIED SITE: ICD-10-CM

## 2020-10-23 DIAGNOSIS — E29.1 TESTICULAR HYPOGONADISM: ICD-10-CM

## 2020-10-23 DIAGNOSIS — G47.33 OSA ON CPAP: ICD-10-CM

## 2020-10-23 DIAGNOSIS — N52.1 ERECTILE DYSFUNCTION DUE TO DISEASES CLASSIFIED ELSEWHERE: ICD-10-CM

## 2020-10-23 DIAGNOSIS — Z99.89 OSA ON CPAP: ICD-10-CM

## 2020-10-23 DIAGNOSIS — Z00.00 ENCOUNTER FOR ANNUAL PHYSICAL EXAM: Primary | ICD-10-CM

## 2020-10-23 DIAGNOSIS — Z23 NEED FOR TDAP VACCINATION: ICD-10-CM

## 2020-10-23 DIAGNOSIS — E66.01 MORBID OBESITY WITH BMI OF 50.0-59.9, ADULT (HCC): ICD-10-CM

## 2020-10-23 DIAGNOSIS — N20.0 NEPHROLITHIASIS: ICD-10-CM

## 2020-10-23 DIAGNOSIS — Z23 NEED FOR INFLUENZA VACCINATION: ICD-10-CM

## 2020-10-23 DIAGNOSIS — Z86.79 HISTORY OF HYPERTENSION: ICD-10-CM

## 2020-10-23 DIAGNOSIS — Z98.84 S/P LAPAROSCOPIC SLEEVE GASTRECTOMY: ICD-10-CM

## 2020-10-23 PROCEDURE — 90471 IMMUNIZATION ADMIN: CPT

## 2020-10-23 PROCEDURE — 90472 IMMUNIZATION ADMIN EACH ADD: CPT

## 2020-10-23 PROCEDURE — 90715 TDAP VACCINE 7 YRS/> IM: CPT

## 2020-10-23 PROCEDURE — 99396 PREV VISIT EST AGE 40-64: CPT | Performed by: FAMILY MEDICINE

## 2020-10-23 PROCEDURE — 90686 IIV4 VACC NO PRSV 0.5 ML IM: CPT

## 2020-10-23 RX ORDER — TADALAFIL 20 MG/1
TABLET ORAL
Qty: 10 TABLET | Refills: 3 | Status: SHIPPED | OUTPATIENT
Start: 2020-10-23

## 2020-10-23 RX ORDER — CHLORAL HYDRATE 500 MG
1000 CAPSULE ORAL DAILY
COMMUNITY

## 2021-01-23 ENCOUNTER — IMMUNIZATIONS (OUTPATIENT)
Dept: FAMILY MEDICINE CLINIC | Facility: HOSPITAL | Age: 51
End: 2021-01-23

## 2021-01-23 DIAGNOSIS — Z23 ENCOUNTER FOR IMMUNIZATION: Primary | ICD-10-CM

## 2021-01-23 PROCEDURE — 0011A SARS-COV-2 / COVID-19 MRNA VACCINE (MODERNA) 100 MCG: CPT

## 2021-01-23 PROCEDURE — 91301 SARS-COV-2 / COVID-19 MRNA VACCINE (MODERNA) 100 MCG: CPT

## 2021-02-20 ENCOUNTER — IMMUNIZATIONS (OUTPATIENT)
Dept: FAMILY MEDICINE CLINIC | Facility: HOSPITAL | Age: 51
End: 2021-02-20

## 2021-02-20 DIAGNOSIS — Z23 ENCOUNTER FOR IMMUNIZATION: Primary | ICD-10-CM

## 2021-02-20 PROCEDURE — 0012A SARS-COV-2 / COVID-19 MRNA VACCINE (MODERNA) 100 MCG: CPT

## 2021-02-20 PROCEDURE — 91301 SARS-COV-2 / COVID-19 MRNA VACCINE (MODERNA) 100 MCG: CPT

## 2021-04-15 ENCOUNTER — OFFICE VISIT (OUTPATIENT)
Dept: FAMILY MEDICINE CLINIC | Facility: CLINIC | Age: 51
End: 2021-04-15
Payer: COMMERCIAL

## 2021-04-15 VITALS
HEIGHT: 71 IN | SYSTOLIC BLOOD PRESSURE: 122 MMHG | HEART RATE: 87 BPM | TEMPERATURE: 98 F | OXYGEN SATURATION: 96 % | WEIGHT: 315 LBS | DIASTOLIC BLOOD PRESSURE: 84 MMHG | BODY MASS INDEX: 44.1 KG/M2

## 2021-04-15 DIAGNOSIS — E29.1 TESTICULAR HYPOGONADISM: ICD-10-CM

## 2021-04-15 DIAGNOSIS — G47.33 OSA ON CPAP: Primary | ICD-10-CM

## 2021-04-15 DIAGNOSIS — Z98.84 S/P LAPAROSCOPIC SLEEVE GASTRECTOMY: ICD-10-CM

## 2021-04-15 DIAGNOSIS — E78.2 MIXED HYPERLIPIDEMIA: ICD-10-CM

## 2021-04-15 DIAGNOSIS — R73.9 HYPERGLYCEMIA: ICD-10-CM

## 2021-04-15 DIAGNOSIS — Z99.89 OSA ON CPAP: Primary | ICD-10-CM

## 2021-04-15 DIAGNOSIS — E66.01 MORBID OBESITY WITH BMI OF 50.0-59.9, ADULT (HCC): ICD-10-CM

## 2021-04-15 PROCEDURE — 1036F TOBACCO NON-USER: CPT | Performed by: FAMILY MEDICINE

## 2021-04-15 PROCEDURE — 99214 OFFICE O/P EST MOD 30 MIN: CPT | Performed by: FAMILY MEDICINE

## 2021-04-15 PROCEDURE — 3008F BODY MASS INDEX DOCD: CPT | Performed by: FAMILY MEDICINE

## 2021-04-15 RX ORDER — TESTOSTERONE 16.2 MG/G
GEL TRANSDERMAL
Qty: 75 G | Refills: 2 | Status: SHIPPED | OUTPATIENT
Start: 2021-04-15

## 2021-04-15 NOTE — PROGRESS NOTES
BMI Counseling: Body mass index is 55 31 kg/m²  The BMI is above normal  Nutrition recommendations include decreasing portion sizes, encouraging healthy choices of fruits and vegetables and moderation in carbohydrate intake  Exercise recommendations include exercising 3-5 times per week  Patient referred to bariatric surgery due to patient being overweight  FAMILY PRACTICE OFFICE VISIT  [facility]   [address]  [facility]  [place], [place], [address]      NAME: Johnny Park  AGE: 49 y o  SEX: male  : 1970   MRN: [id number]    DATE: 4/15/2021  TIME: 5:08 PM    Assessment and Plan     Problem List Items Addressed This Visit        Endocrine    Testicular hypogonadism    Relevant Medications    testosterone (AndroGel Pump) 1 62 % TD gel pump       Respiratory    WALDO on CPAP - Primary    Relevant Orders    CPAP Study       Other    Morbid obesity with BMI of 50 0-59 9, adult ([facility] )    Mixed hyperlipidemia    Hyperglycemia ( hx diabetes pre-bariatric surgery)    Relevant Orders    Basic metabolic panel    Hemoglobin A1C    S/P laparoscopic sleeve gastrectomy          Patient Instructions   Had CPAP study elsewhere about 12 years ago, study unavailable  Has used CPAP routinely with benefit since then, unknown settings, needs redo sleep study with retitration  We will check with [employer] regarding settings, we may be able to get a temporary CPAP unit for him as his unit is not working  He does plan to contact Dr Marietta Marcos regarding colonoscopy screening, last was in , father had colon cancer -  they had done bariatric sleeve in the past   He has gained about 30 lb in the last year  Preop weight was 420 lb, remotely had been up to 460 lb    A1c back in September had risen from 5 7 to 6 2, I would like him to redo A1c along with BMP  He did have cholesterol back in September, 217 with HDL 38,     Future redo lipids, future statin  Uric acid in September 7 6, stay on allopurinol, has had no issues with gout  Does use testosterone about 3 times weekly, testosterone level back in September 1 86, PSA 0 8, CBC okay  I did refill testosterone  He has received both COVID vaccines      We will see him in 6 months with a physical, call sooner if needed      Chief Complaint     Chief Complaint   Patient presents with    Follow-up       History of Present Illness   Jeanmarie Pinto is a 49y o -year-old male who I had seen back in October with a full physical   He relates his CPAP unit has stopped working, he had that for 12 years, last sleep study was about 12 years ago  He has gained further weight since I had seen him, past history bariatric sleeve, has not had recent follow-up with surgeon  Has received COVID vaccines  Is using testosterone roughly 3 times weekly  Review of Systems   Review of Systems   Constitutional: Positive for fatigue and unexpected weight change  Negative for appetite change and fever  HENT: Negative for sore throat and trouble swallowing  Respiratory: Negative for cough, chest tightness and shortness of breath  Cardiovascular: Negative for chest pain, palpitations and leg swelling  Gastrointestinal: Negative for abdominal pain, blood in stool, nausea and vomiting  No acid reflux     No change in bowel   Genitourinary: Negative for dysuria, hematuria and testicular pain  Tried Cialis without much benefit, on testosterone as in HPI, only uses about 3 times a week   Musculoskeletal:        No issues with gout   Neurological: Negative for dizziness, syncope, light-headedness and headaches  Psychiatric/Behavioral: Negative for behavioral problems and confusion         Active Problem List     Patient Active Problem List   Diagnosis    Testicular hypogonadism    WALDO on CPAP    Nephrolithiasis    Morbid obesity with BMI of 50 0-59 9, adult ([facility] [address] )    Mixed hyperlipidemia    Gout    Hyperglycemia ( hx diabetes pre-bariatric surgery)    Osteoarthritis of left knee    Vitamin D deficiency    S/P laparoscopic sleeve gastrectomy    History of hypertension-resolved with bariatric procedure       Past Medical History:  Reviewed    Past Surgical History:  Reviewed    Family History:  Reviewed    Social History:  Reviewed    Objective     Vitals:    04/15/21 1513   BP: 122/84   BP Location: Left arm   Patient Position: Sitting   Cuff Size: Large   Pulse: 87   Temp: 98 °F (36 7 °C)   SpO2: 96%   Weight: (!) 177 kg (391 lb)   Height: 5' 10 5" (1 791 m)     Body mass index is 55 31 kg/m²  BP Readings from Last 3 Encounters:   04/15/21 122/84   10/23/20 130/86   09/05/19 128/76       Wt Readings from Last 3 Encounters:   04/15/21 (!) 177 kg (391 lb)   10/23/20 (!) 172 kg (380 lb)   03/26/20 (!) 162 kg (358 lb)       Physical Exam  Constitutional:       General: He is not in acute distress  Appearance: Normal appearance  He is well-developed  He is obese  He is not ill-appearing  Eyes:      General: No scleral icterus  Cardiovascular:      Rate and Rhythm: Normal rate and regular rhythm  Heart sounds: Normal heart sounds  No murmur  Pulmonary:      Effort: Pulmonary effort is normal  No respiratory distress  Breath sounds: Normal breath sounds  Abdominal:      Palpations: Abdomen is soft  Tenderness: There is no abdominal tenderness  Musculoskeletal:      Right lower leg: No edema  Left lower leg: No edema  Lymphadenopathy:      Cervical: No cervical adenopathy  Skin:     Coloration: Skin is not jaundiced  Neurological:      Mental Status: He is alert and oriented to person, place, and time     Psychiatric:         Mood and Affect: Mood normal          Behavior: Behavior normal          ALLERGIES:  Allergies   Allergen Reactions    Augmentin  [Amoxicillin-Pot Clavulanate] Diarrhea    Metformin Diarrhea     Severe diarrhea at low dose but tolerates XR dose       Current Medications     Current Outpatient Medications   Medication Sig Dispense Refill    allopurinol (ZYLOPRIM) 300 mg tablet Take 1 tablet (300 mg total) by mouth daily 90 tablet 3    B Complex Vitamins (VITAMIN B COMPLEX PO) Take 1 tablet by mouth      Calcium Carb-Cholecalciferol (CALCIUM CARBONATE-VITAMIN D3 PO) Take 1 tablet by mouth      Cholecalciferol (Vitamin D) 50 MCG (2000 UT) tablet Take 1 tablet (2,000 Units total) by mouth daily 30 tablet 11    Coenzyme Q10 (COQ10 PO) Take by mouth every other day Liquid      Multiple Vitamin (MULTI-VITAMIN DAILY) TABS Take 1 tablet by mouth daily      Omega-3 Fatty Acids (fish oil) 1,000 mg Take 1,000 mg by mouth daily      testosterone (AndroGel Pump) 1 62 % TD gel pump Use 2 pump presses daily 75 g 2    Turmeric 1053 MG TABS Take by mouth      vitamin B-12 (VITAMIN B-12) 1,000 mcg tablet Take 1,000 mcg by mouth daily      tadalafil (CIALIS) 20 MG tablet Use 1/2 to 1 pill every 36 hours as needed 10 tablet 3     No current facility-administered medications for this visit               Orders Placed This Encounter   Procedures    Basic metabolic panel    Hemoglobin A1C    CPAP Study         Ghanshyam Key DO

## 2021-04-15 NOTE — PATIENT INSTRUCTIONS
Had CPAP study elsewhere about 12 years ago, study unavailable  Has used CPAP routinely with benefit since then, unknown settings, needs redo sleep study with retitration  We will check with [employer] regarding settings, we may be able to get a temporary CPAP unit for him as his unit is not working  He does plan to contact Dr Santosh [place] regarding colonoscopy screening, last was in 2011, father had colon cancer -  they had done bariatric sleeve in the past   He has gained about 30 lb in the last year  Preop weight was 420 lb, remotely had been up to 460 lb    A1c back in September had risen from 5 7 to 6 2, I would like him to redo A1c along with BMP  He did have cholesterol back in September, 217 with HDL 38,   Future redo lipids, future statin  Uric acid in September 7 6, stay on allopurinol, has had no issues with gout  Does use testosterone about 3 times weekly, testosterone level back in September 1 86, PSA 0 8, CBC okay  I did refill testosterone      He has received both COVID vaccines      We will see him in 6 months with a physical, call sooner if needed

## 2021-04-16 ENCOUNTER — TELEPHONE (OUTPATIENT)
Dept: FAMILY MEDICINE CLINIC | Facility: CLINIC | Age: 51
End: 2021-04-16

## 2021-04-16 DIAGNOSIS — G47.33 OSA ON CPAP: Primary | ICD-10-CM

## 2021-04-16 DIAGNOSIS — Z99.89 OSA ON CPAP: Primary | ICD-10-CM

## 2021-04-16 NOTE — TELEPHONE ENCOUNTER
I placed order for CPAP unit, he can adjust settings at home as directed by supplier, he also will be doing another CPAP study to retitrate setting

## 2021-04-16 NOTE — TELEPHONE ENCOUNTER
Called [facility] this morning regarding CPAP  unit   Shelly Christopher (from [facility]) informed me that he is eligible for new unit but they do not have his settings  We can send Rx for CPAP unit with Auto settings and patient can adjust settings by him self  Fax Rx to [phone]

## 2021-04-20 ENCOUNTER — TELEPHONE (OUTPATIENT)
Dept: SLEEP CENTER | Facility: CLINIC | Age: 51
End: 2021-04-20

## 2021-04-20 NOTE — TELEPHONE ENCOUNTER
----- Message from Mannie Kaur MD sent at 4/20/2021 11:11 AM EDT -----  Approved  ----- Message -----  From: Giorgio Barnes  Sent: 4/20/2021   9:36 AM EDT  To: Sleep Medicine [place] Provider    This sleep study needs approval      If approved please sign and return to clerical pool  If denied please include reasons why  Also provide alternative testing if warranted  Please sign and return to clerical pool

## 2021-04-21 ENCOUNTER — TELEPHONE (OUTPATIENT)
Dept: SLEEP CENTER | Facility: CLINIC | Age: 51
End: 2021-04-21

## 2021-04-21 DIAGNOSIS — Z20.822 ENCOUNTER FOR PREPROCEDURE SCREENING LABORATORY TESTING FOR COVID-19: Primary | ICD-10-CM

## 2021-04-21 DIAGNOSIS — Z01.812 ENCOUNTER FOR PREPROCEDURE SCREENING LABORATORY TESTING FOR COVID-19: Primary | ICD-10-CM

## 2021-05-28 NOTE — TELEPHONE ENCOUNTER
Left message for patient  Advised of new COVID testing protocol for PAP studies  Since he is fully vaccinated he will not need to have COVID test completed prior to PAP study on 6/4/21  Left call back number for any questions

## 2021-06-02 ENCOUNTER — TELEPHONE (OUTPATIENT)
Dept: FAMILY MEDICINE CLINIC | Facility: CLINIC | Age: 51
End: 2021-06-02

## 2021-06-02 DIAGNOSIS — Z11.52 ENCOUNTER FOR SCREENING FOR COVID-19: Primary | ICD-10-CM

## 2021-06-02 DIAGNOSIS — Z11.52 ENCOUNTER FOR SCREENING FOR COVID-19: ICD-10-CM

## 2021-06-02 PROCEDURE — U0003 INFECTIOUS AGENT DETECTION BY NUCLEIC ACID (DNA OR RNA); SEVERE ACUTE RESPIRATORY SYNDROME CORONAVIRUS 2 (SARS-COV-2) (CORONAVIRUS DISEASE [COVID-19]), AMPLIFIED PROBE TECHNIQUE, MAKING USE OF HIGH THROUGHPUT TECHNOLOGIES AS DESCRIBED BY CMS-2020-01-R: HCPCS | Performed by: FAMILY MEDICINE

## 2021-06-02 PROCEDURE — U0005 INFEC AGEN DETEC AMPLI PROBE: HCPCS | Performed by: FAMILY MEDICINE

## 2021-06-03 LAB — SARS-COV-2 RNA RESP QL NAA+PROBE: NEGATIVE

## 2021-06-18 ENCOUNTER — RA CDI HCC (OUTPATIENT)
Dept: OTHER | Facility: HOSPITAL | Age: 51
End: 2021-06-18

## 2021-06-18 NOTE — PROGRESS NOTES
[facility]  coding opportunities                             Patients insurance company: [employer] (Medicare Advantage and Commercial)             [facility]  coding opportunities          Chart reviewed, no opportunity found: CHART REVIEWED, NO OPPORTUNITY FOUND                     Patients insurance company: [employer] (Medicare Advantage and Commercial)           [place] [address]  coding opportunities                             Patients insurance company: [employer] (Medicare Advantage and Commercial)

## 2021-07-08 ENCOUNTER — TELEPHONE (OUTPATIENT)
Dept: FAMILY MEDICINE CLINIC | Facility: CLINIC | Age: 51
End: 2021-07-08

## 2021-08-17 ENCOUNTER — HOSPITAL ENCOUNTER (OUTPATIENT)
Dept: SLEEP CENTER | Facility: CLINIC | Age: 51
Discharge: HOME | End: 2021-08-17
Payer: COMMERCIAL

## 2021-08-17 DIAGNOSIS — Z99.89 OSA ON CPAP: ICD-10-CM

## 2021-08-17 DIAGNOSIS — G47.33 OSA ON CPAP: ICD-10-CM

## 2021-08-17 PROCEDURE — 95811 POLYSOM 6/>YRS CPAP 4/> PARM: CPT

## 2021-08-17 PROCEDURE — 95811 POLYSOM 6/>YRS CPAP 4/> PARM: CPT | Performed by: INTERNAL MEDICINE

## 2021-08-18 NOTE — PROGRESS NOTES
Sleep Study Documentation    Pre-Sleep Study       Sleep testing procedure explained to patient:YES    Patient napped prior to study:NO    Caffeine:Dayshift worker after 12PM   Caffeine use:YES- soda  12 to 26 ounces    Alcohol:Dayshift workers after 5PM: Alcohol use:NO    Typical day for patient:YES       Study Documentation    Sleep Study Indications:  WALDO-diagnosed in-lab study  Sleep Study: Treatment   Optimal PAP pressure:  11 cm H2O  Leak:None  Snore:Eliminated  REM Obtained:yes  Supplemental O2: no    Minimum SaO2  89 %  Baseline SaO2  95 3 %  PAP mask tried (list all) ResMed AirFit Large N20 Nasal Mask with no humidity  PAP mask choice (final) Same  PAP mask type:nasal  PAP pressure at which snoring was eliminated  11 cm H2O  Minimum SaO2 at final PAP pressure  93 %  Mode of Therapy:CPAP  ETCO2:No  CPAP changed to BiPAP:No    Mode of Therapy:CPAP    EKG abnormalities: no     EEG abnormalities: yes:  ECG artifact throughout study  Averaged Ms  Sleep Study Recorded < 2 hours: N/A    Sleep Study Recorded > 2 hours but incomplete study: N/A    Sleep Study Recorded 6 hours but no sleep obtained: NO          Post-Sleep Study    Medication used at bedtime or during sleep study:NO    Patient reports time it took to fall asleep:less than 20 minutes    Patient reports waking up during study:1 to 2 times  Patient reports returning to sleep in 10 to 30 minutes  Patient reports sleeping 4 to 6 hours with dreaming  Patient reports sleep during study:typical    Patient rated sleepiness: Not sleepy or tired    PAP treatment:yes: Post PAP treatment patient reports feeling unchanged and would wear PAP mask at home

## 2021-08-24 ENCOUNTER — TELEPHONE (OUTPATIENT)
Dept: SLEEP CENTER | Facility: CLINIC | Age: 51
End: 2021-08-24

## 2021-08-26 NOTE — TELEPHONE ENCOUNTER
Returned the patient's call   Left call back message to schedule also left number for central scheduling

## 2021-11-09 DIAGNOSIS — M1A.00X0 IDIOPATHIC CHRONIC GOUT WITHOUT TOPHUS, UNSPECIFIED SITE: ICD-10-CM

## 2021-11-09 RX ORDER — ALLOPURINOL 300 MG/1
TABLET ORAL
Qty: 90 TABLET | Refills: 1 | Status: SHIPPED | OUTPATIENT
Start: 2021-11-09 | End: 2022-05-29

## 2021-12-03 ENCOUNTER — OFFICE VISIT (OUTPATIENT)
Dept: SLEEP CENTER | Facility: CLINIC | Age: 51
End: 2021-12-03
Payer: COMMERCIAL

## 2021-12-03 VITALS
SYSTOLIC BLOOD PRESSURE: 128 MMHG | HEIGHT: 71 IN | BODY MASS INDEX: 44.1 KG/M2 | WEIGHT: 315 LBS | HEART RATE: 86 BPM | OXYGEN SATURATION: 98 % | DIASTOLIC BLOOD PRESSURE: 82 MMHG

## 2021-12-03 DIAGNOSIS — E66.01 MORBID OBESITY WITH BMI OF 50.0-59.9, ADULT (HCC): ICD-10-CM

## 2021-12-03 DIAGNOSIS — G47.33 OBSTRUCTIVE SLEEP APNEA TREATED WITH CONTINUOUS POSITIVE AIRWAY PRESSURE (CPAP): Primary | ICD-10-CM

## 2021-12-03 DIAGNOSIS — Z99.89 OBSTRUCTIVE SLEEP APNEA TREATED WITH CONTINUOUS POSITIVE AIRWAY PRESSURE (CPAP): Primary | ICD-10-CM

## 2021-12-03 PROCEDURE — 3008F BODY MASS INDEX DOCD: CPT | Performed by: NURSE PRACTITIONER

## 2021-12-03 PROCEDURE — 1036F TOBACCO NON-USER: CPT | Performed by: NURSE PRACTITIONER

## 2021-12-03 PROCEDURE — 99244 OFF/OP CNSLTJ NEW/EST MOD 40: CPT | Performed by: NURSE PRACTITIONER

## 2021-12-06 ENCOUNTER — TELEPHONE (OUTPATIENT)
Dept: SLEEP CENTER | Facility: CLINIC | Age: 51
End: 2021-12-06

## 2021-12-08 ENCOUNTER — TELEPHONE (OUTPATIENT)
Dept: SLEEP CENTER | Facility: CLINIC | Age: 51
End: 2021-12-08

## 2022-04-15 ENCOUNTER — RA CDI HCC (OUTPATIENT)
Dept: OTHER | Facility: HOSPITAL | Age: 52
End: 2022-04-15

## 2022-04-15 NOTE — PROGRESS NOTES
Please review if the following dx  is applicable to the patient's condition and assess and document, if applicable in next visit on 04/25/2022    E66 01: Morbid (severe) obesity due to excess calories ([address] ) -     Per CMS/ICD 10 coding guidelines, BMI of 40 or higher; Class 3 obesity is sometimes categorized as "morbid," "extreme," or "severe" obesity      [address]  coding opportunities          Chart Reviewed number of suggestions sent to Provider: 1     Patients Insurance        Commercial Insurance: [address] [place] [address]

## 2022-04-25 ENCOUNTER — OFFICE VISIT (OUTPATIENT)
Dept: FAMILY MEDICINE CLINIC | Facility: CLINIC | Age: 52
End: 2022-04-25
Payer: COMMERCIAL

## 2022-04-25 VITALS
TEMPERATURE: 97.6 F | DIASTOLIC BLOOD PRESSURE: 90 MMHG | HEART RATE: 78 BPM | HEIGHT: 71 IN | SYSTOLIC BLOOD PRESSURE: 148 MMHG | BODY MASS INDEX: 44.1 KG/M2 | OXYGEN SATURATION: 95 % | WEIGHT: 315 LBS

## 2022-04-25 DIAGNOSIS — R73.9 HYPERGLYCEMIA: ICD-10-CM

## 2022-04-25 DIAGNOSIS — Z12.11 COLON CANCER SCREENING: ICD-10-CM

## 2022-04-25 DIAGNOSIS — E66.01 MORBID OBESITY WITH BMI OF 50.0-59.9, ADULT (HCC): ICD-10-CM

## 2022-04-25 DIAGNOSIS — Z98.84 S/P LAPAROSCOPIC SLEEVE GASTRECTOMY: ICD-10-CM

## 2022-04-25 DIAGNOSIS — N20.0 NEPHROLITHIASIS: ICD-10-CM

## 2022-04-25 DIAGNOSIS — E78.2 MIXED HYPERLIPIDEMIA: ICD-10-CM

## 2022-04-25 DIAGNOSIS — G47.33 OBSTRUCTIVE SLEEP APNEA TREATED WITH CONTINUOUS POSITIVE AIRWAY PRESSURE (CPAP): ICD-10-CM

## 2022-04-25 DIAGNOSIS — E29.1 TESTICULAR HYPOGONADISM: ICD-10-CM

## 2022-04-25 DIAGNOSIS — K91.2 POSTOPERATIVE MALABSORPTION: ICD-10-CM

## 2022-04-25 DIAGNOSIS — Z86.79 HISTORY OF HYPERTENSION: ICD-10-CM

## 2022-04-25 DIAGNOSIS — Z00.00 ENCOUNTER FOR ANNUAL PHYSICAL EXAM: Primary | ICD-10-CM

## 2022-04-25 DIAGNOSIS — M1A.9XX0 CHRONIC GOUT WITHOUT TOPHUS, UNSPECIFIED CAUSE, UNSPECIFIED SITE: ICD-10-CM

## 2022-04-25 DIAGNOSIS — Z12.5 SCREENING PSA (PROSTATE SPECIFIC ANTIGEN): ICD-10-CM

## 2022-04-25 DIAGNOSIS — Z99.89 OBSTRUCTIVE SLEEP APNEA TREATED WITH CONTINUOUS POSITIVE AIRWAY PRESSURE (CPAP): ICD-10-CM

## 2022-04-25 DIAGNOSIS — H93.11 TINNITUS OF RIGHT EAR: ICD-10-CM

## 2022-04-25 LAB — SL AMB POCT HEMOGLOBIN AIC: 6.5 (ref ?–6.5)

## 2022-04-25 PROCEDURE — 83036 HEMOGLOBIN GLYCOSYLATED A1C: CPT | Performed by: FAMILY MEDICINE

## 2022-04-25 PROCEDURE — 3008F BODY MASS INDEX DOCD: CPT | Performed by: FAMILY MEDICINE

## 2022-04-25 PROCEDURE — 99396 PREV VISIT EST AGE 40-64: CPT | Performed by: FAMILY MEDICINE

## 2022-04-25 PROCEDURE — 1036F TOBACCO NON-USER: CPT | Performed by: FAMILY MEDICINE

## 2022-04-25 PROCEDURE — 3725F SCREEN DEPRESSION PERFORMED: CPT | Performed by: FAMILY MEDICINE

## 2022-04-25 NOTE — PATIENT INSTRUCTIONS
Reviewed health history, his redo A1c today has risen to 6 5, blood pressure is also borderline high  He has gained weight back since bariatric sleeve in 2018, he does plan to get back to the gym  He will work at weight loss, plans to get back to see his weight management group  Also is in need of redo colonoscopy  He will check blood pressure outside of office, also try to check glucometer readings periodically, call us if needs test strips  Discussed may need to start medication again, we will re-evaluate in 6 months  He is doing well with CPAP unit, does see sleep specialist     We did review previous blood work, he will redo fasting blood work  He is up to date with Lipid screening  Await redo  Last in 2020 was 217 with HDL 38,   He has had no issues with gout    He uses testosterone twice a week or so, can continue as is, include screening PSA with blood work, future redo testosterone level if using more consistently  He does have left knee pain along with right Achilles pain, requests referral to orthopedist, can see [facility]    He does have intermittent tinnitus right ear, exam is unremarkable here today, does not feel has hearing loss today but does wish to have hearing evaluations /see ENT  Immunization History   Administered Date(s) Administered    COVID-19 MODERNA VACC 0 5 ML IM 01/23/2021, 02/20/2021, 10/18/2021    INFLUENZA 02/15/2016, 10/16/2021    Influenza Quadrivalent Preservative Free 3 years and older IM 09/26/2014    Influenza Quadrivalent, 6-35 Months IM 02/15/2016    Influenza, injectable, quadrivalent, preservative free 0 5 mL 10/23/2020    Influenza, seasonal, injectable 10/11/2013    Tdap 10/23/2020    Tuberculin Skin Test-PPD Intradermal 04/01/2011         He does do yearly Flu shot     Tdap/tetanus shot is up to date  (done every 10 yrs for superficial cuts, every 5 yrs for deep wounds)  Covid vaccine rcvd x 3      Was never a smoker    Discussed Prostate Cancer screening pros/cons starting at age 48  PSA blood test  ordered  Regarding Hepatitis C Screening - he will not do Hepatitis C screen  low risk  Continue to try to watch healthy diet, exercise routinely  We will see him again in 6 months, sooner as needed

## 2022-04-25 NOTE — PROGRESS NOTES
BMI Counseling: Body mass index is 57 32 kg/m²  The BMI is above normal  Nutrition recommendations include decreasing portion sizes, encouraging healthy choices of fruits and vegetables and moderation in carbohydrate intake  Exercise recommendations include exercising 3-5 times per week  Rationale for BMI follow-up plan is due to patient being overweight or obese  Depression Screening and Follow-up Plan: Patient was screened for depression during today's encounter  They screened negative with a PHQ-2 score of 1  FAMILY PRACTICE OFFICE VISIT  [facility]  [address]  [address], [place], [facility]      NAME: Shun Mitchell  AGE: 47 y o   SEX: male  : 1970   MRN: [id number]    DATE: 2022  TIME: 5:04 PM    Assessment and Plan     Problem List Items Addressed This Visit     Testicular hypogonadism    Relevant Orders    Comprehensive metabolic panel    PSA, Total Screen    Obstructive sleep apnea treated with continuous positive airway pressure (CPAP)    Relevant Orders    TSH, 3rd generation with Free T4 reflex    Nephrolithiasis    Relevant Orders    Comprehensive metabolic panel    Vitamin D 25 hydroxy    Morbid obesity with BMI of 50 0-59 9, adult (HCC)    Relevant Orders    POCT hemoglobin A1c (Completed)    TSH, 3rd generation with Free T4 reflex    Mixed hyperlipidemia    Relevant Orders    Comprehensive metabolic panel    Lipid panel    Gout    Relevant Orders    Comprehensive metabolic panel    Uric acid    Hyperglycemia ( hx diabetes pre-bariatric surgery)    Relevant Orders    POCT hemoglobin A1c (Completed)    Comprehensive metabolic panel    S/P laparoscopic sleeve gastrectomy    Relevant Orders    Ambulatory Referral to General Surgery    History of hypertension-resolved with bariatric procedure    Relevant Orders    Comprehensive metabolic panel      Other Visit Diagnoses     Encounter for annual physical exam -  Primary    Colon cancer screening        Relevant Orders    Ambulatory Referral to General Surgery    Postoperative malabsorption        Relevant Orders    CBC    Comprehensive metabolic panel    Vitamin D 25 hydroxy    Screening PSA (prostate specific antigen)        Relevant Orders    PSA, Total Screen    Tinnitus of right ear        Relevant Orders    Ambulatory Referral to Otolaryngology          Patient Instructions     Reviewed health history, his redo A1c today has risen to 6 5, blood pressure is also borderline high  He has gained weight back since bariatric sleeve in 2018, he does plan to get back to the gym  He will work at weight loss, plans to get back to see his weight management group  Also is in need of redo colonoscopy  He will check blood pressure outside of office, also try to check glucometer readings periodically, call us if needs test strips  Discussed may need to start medication again, we will re-evaluate in 6 months  He is doing well with CPAP unit, does see sleep specialist     We did review previous blood work, he will redo fasting blood work  He is up to date with Lipid screening  Await redo  Last in 2020 was 217 with HDL 38,   He has had no issues with gout    He uses testosterone twice a week or so, can continue as is, include screening PSA with blood work, future redo testosterone level if using more consistently  He does have left knee pain along with right Achilles pain, requests referral to orthopedist, can see Chery Nixon    He does have intermittent tinnitus right ear, exam is unremarkable here today, does not feel has hearing loss today but does wish to have hearing evaluations /see ENT      Immunization History   Administered Date(s) Administered    COVID-19 MODERNA VACC 0 5 ML IM 01/23/2021, 02/20/2021, 10/18/2021    INFLUENZA 02/15/2016, 10/16/2021    Influenza Quadrivalent Preservative Free 3 years and older IM 09/26/2014    Influenza Quadrivalent, 6-35 Months IM 02/15/2016    Influenza, injectable, quadrivalent, preservative free 0 5 mL 10/23/2020    Influenza, seasonal, injectable 10/11/2013    Tdap 10/23/2020    Tuberculin Skin Test-PPD Intradermal 04/01/2011         He does do yearly Flu shot  Tdap/tetanus shot is up to date  (done every 10 yrs for superficial cuts, every 5 yrs for deep wounds)  Covid vaccine rcvd x 3      Was never a smoker    Discussed Prostate Cancer screening pros/cons starting at age 48  PSA blood test  ordered  Regarding Hepatitis C Screening - he will not do Hepatitis C screen  low risk  Continue to try to watch healthy diet, exercise routinely  We will see him again in 6 months, sooner as needed  Chief Complaint     Chief Complaint   Patient presents with    Follow-up       History of Present Illness   Efren Alva is a 47y o -year-old male who is in today for a routine physical, he just got back from a a conference in [facility], only slept 1 hour, feels that is white blood pressure is high  Has not been exercising with plans to get back at exercise  Has not seen his bariatric group, had bariatric sleeve in December of 2018, unfortunately has gained weight back  He relates he is using testosterone about twice a week, is using his vitamins  Has had no issues with gout  Has had no chest pain or increased shortness of breath    Wants to see an ENT for hearing test, had tinnitus right ear  Also requesting evaluation with orthopedist/ankle specialist regarding right ankle pain      Review of Systems   Review of Systems   Constitutional: Positive for fatigue (He feels this is improved, is using CPAP, see sleep specialist)  Negative for appetite change, fever and unexpected weight change  HENT: Positive for tinnitus  Negative for sore throat and trouble swallowing  Respiratory: Negative for cough, chest tightness, shortness of breath and wheezing      Cardiovascular: Negative for chest pain, palpitations and leg swelling  Gastrointestinal: Negative for abdominal pain, blood in stool, nausea and vomiting  No acid reflux     No change in bowel-still has not redone colonoscopy, family history colon cancer   Genitourinary: Negative for dysuria and hematuria  Neurological: Negative for dizziness, syncope, light-headedness and headaches  Psychiatric/Behavioral: Negative for behavioral problems and confusion  Active Problem List     Patient Active Problem List   Diagnosis    Testicular hypogonadism    Obstructive sleep apnea treated with continuous positive airway pressure (CPAP)    Nephrolithiasis    Morbid obesity with BMI of 50 0-59 9, adult ([facility] [address] )    Mixed hyperlipidemia    Gout    Hyperglycemia ( hx diabetes pre-bariatric surgery)    Osteoarthritis of left knee    Vitamin D deficiency    S/P laparoscopic sleeve gastrectomy    History of hypertension-resolved with bariatric procedure       Past Medical History:  Reviewed    Past Surgical History:  Reviewed    Family History:  Reviewed    Social History:  Reviewed    Objective     Vitals:    04/25/22 1442   BP: 148/90   BP Location: Left arm   Patient Position: Sitting   Cuff Size: Large   Pulse: 78   Temp: 97 6 °F (36 4 °C)   SpO2: 95%   Weight: (!) 186 kg (411 lb)   Height: 5' 11" (1 803 m)     Body mass index is 57 32 kg/m²  BP Readings from Last 3 Encounters:   04/25/22 148/90   12/03/21 128/82   04/15/21 122/84       Wt Readings from Last 3 Encounters:   04/25/22 (!) 186 kg (411 lb)   12/03/21 (!) 186 kg (409 lb)   04/15/21 (!) 177 kg (391 lb)       Physical Exam  Constitutional:       General: He is not in acute distress  Appearance: Normal appearance  He is well-developed  He is obese  He is not ill-appearing  HENT:      Right Ear: Tympanic membrane normal       Left Ear: Tympanic membrane normal    Eyes:      General: No scleral icterus  Neck:      Vascular: No carotid bruit     Cardiovascular:      Rate and Rhythm: Normal rate and regular rhythm  Heart sounds: Normal heart sounds  No murmur heard  Pulmonary:      Effort: Pulmonary effort is normal  No respiratory distress  Breath sounds: Normal breath sounds  No wheezing, rhonchi or rales  Abdominal:      Palpations: Abdomen is soft  Tenderness: There is no abdominal tenderness  Musculoskeletal:      Right lower leg: No edema  Left lower leg: No edema  Lymphadenopathy:      Cervical: No cervical adenopathy  Skin:     Coloration: Skin is not jaundiced  Neurological:      Mental Status: He is alert and oriented to person, place, and time  Mental status is at baseline     Psychiatric:         Behavior: Behavior normal          ALLERGIES:  Allergies   Allergen Reactions    Augmentin  [Amoxicillin-Pot Clavulanate] Diarrhea    Metformin Diarrhea     Severe diarrhea at low dose but tolerates XR dose       Current Medications     Current Outpatient Medications   Medication Sig Dispense Refill    allopurinol (ZYLOPRIM) 300 mg tablet TAKE 1 TABLET BY MOUTH EVERY DAY 90 tablet 1    Calcium Carb-Cholecalciferol (CALCIUM CARBONATE-VITAMIN D3 PO) Take 1 tablet by mouth      Cholecalciferol (Vitamin D) 50 MCG (2000 UT) tablet Take 1 tablet (2,000 Units total) by mouth daily 30 tablet 11    Coenzyme Q10 (COQ10 PO) Take by mouth every other day Liquid      Multiple Vitamin (MULTI-VITAMIN DAILY) TABS Take 1 tablet by mouth daily      Omega-3 Fatty Acids (fish oil) 1,000 mg Take 1,000 mg by mouth daily      testosterone (AndroGel Pump) 1 62 % TD gel pump Use 2 pump presses daily 75 g 2    Turmeric 1053 MG TABS Take by mouth      vitamin B-12 (VITAMIN B-12) 1,000 mcg tablet Take 1,000 mcg by mouth daily      B Complex Vitamins (VITAMIN B COMPLEX PO) Take 1 tablet by mouth      tadalafil (CIALIS) 20 MG tablet Use 1/2 to 1 pill every 36 hours as needed (Patient not taking: Reported on 4/25/2022 ) 10 tablet 3     No current facility-administered medications for this visit              Orders Placed This Encounter   Procedures    CBC    Comprehensive metabolic panel    Lipid panel    Uric acid    TSH, 3rd generation with Free T4 reflex    Vitamin D 25 hydroxy    PSA, Total Screen    Ambulatory Referral to General Surgery    Ambulatory Referral to Otolaryngology    POCT hemoglobin A1c         Genesis Costa DO

## 2022-05-29 DIAGNOSIS — M1A.00X0 IDIOPATHIC CHRONIC GOUT WITHOUT TOPHUS, UNSPECIFIED SITE: ICD-10-CM

## 2022-05-29 RX ORDER — ALLOPURINOL 300 MG/1
TABLET ORAL
Qty: 90 TABLET | Refills: 1 | Status: SHIPPED | OUTPATIENT
Start: 2022-05-29

## 2022-06-29 ENCOUNTER — APPOINTMENT (OUTPATIENT)
Dept: LAB | Facility: CLINIC | Age: 52
End: 2022-06-29
Payer: COMMERCIAL

## 2022-06-29 DIAGNOSIS — Z12.5 SCREENING PSA (PROSTATE SPECIFIC ANTIGEN): ICD-10-CM

## 2022-06-29 DIAGNOSIS — E29.1 TESTICULAR HYPOGONADISM: ICD-10-CM

## 2022-06-29 LAB
25(OH)D3 SERPL-MCNC: 24 NG/ML (ref 30–100)
ALBUMIN SERPL BCP-MCNC: 3.6 G/DL (ref 3.5–5)
ALP SERPL-CCNC: 96 U/L (ref 46–116)
ALT SERPL W P-5'-P-CCNC: 24 U/L (ref 12–78)
ANION GAP SERPL CALCULATED.3IONS-SCNC: 7 MMOL/L (ref 4–13)
AST SERPL W P-5'-P-CCNC: 17 U/L (ref 5–45)
BILIRUB SERPL-MCNC: 0.78 MG/DL (ref 0.2–1)
BUN SERPL-MCNC: 15 MG/DL (ref 5–25)
CALCIUM SERPL-MCNC: 9.4 MG/DL (ref 8.3–10.1)
CHLORIDE SERPL-SCNC: 108 MMOL/L (ref 100–108)
CHOLEST SERPL-MCNC: 241 MG/DL
CO2 SERPL-SCNC: 27 MMOL/L (ref 21–32)
CREAT SERPL-MCNC: 0.95 MG/DL (ref 0.6–1.3)
ERYTHROCYTE [DISTWIDTH] IN BLOOD BY AUTOMATED COUNT: 13.6 % (ref 11.6–15.1)
GFR SERPL CREATININE-BSD FRML MDRD: 92 ML/MIN/1.73SQ M
GLUCOSE P FAST SERPL-MCNC: 109 MG/DL (ref 65–99)
HCT VFR BLD AUTO: 44.5 % (ref 36.5–49.3)
HDLC SERPL-MCNC: 48 MG/DL
HGB BLD-MCNC: 14.1 G/DL (ref 12–17)
LDLC SERPL CALC-MCNC: 151 MG/DL (ref 0–100)
MCH RBC QN AUTO: 29 PG (ref 26.8–34.3)
MCHC RBC AUTO-ENTMCNC: 31.7 G/DL (ref 31.4–37.4)
MCV RBC AUTO: 92 FL (ref 82–98)
NONHDLC SERPL-MCNC: 193 MG/DL
PLATELET # BLD AUTO: 243 THOUSANDS/UL (ref 149–390)
PMV BLD AUTO: 12 FL (ref 8.9–12.7)
POTASSIUM SERPL-SCNC: 4 MMOL/L (ref 3.5–5.3)
PROT SERPL-MCNC: 7.6 G/DL (ref 6.4–8.2)
PSA SERPL-MCNC: 0.7 NG/ML (ref 0–4)
RBC # BLD AUTO: 4.86 MILLION/UL (ref 3.88–5.62)
SODIUM SERPL-SCNC: 142 MMOL/L (ref 136–145)
TRIGL SERPL-MCNC: 210 MG/DL
TSH SERPL DL<=0.05 MIU/L-ACNC: 1.6 UIU/ML (ref 0.45–4.5)
URATE SERPL-MCNC: 7 MG/DL (ref 4.2–8)
WBC # BLD AUTO: 7.37 THOUSAND/UL (ref 4.31–10.16)

## 2022-06-29 PROCEDURE — 84443 ASSAY THYROID STIM HORMONE: CPT | Performed by: FAMILY MEDICINE

## 2022-06-29 PROCEDURE — G0103 PSA SCREENING: HCPCS

## 2022-06-29 PROCEDURE — 84550 ASSAY OF BLOOD/URIC ACID: CPT | Performed by: FAMILY MEDICINE

## 2022-06-29 PROCEDURE — 82306 VITAMIN D 25 HYDROXY: CPT | Performed by: FAMILY MEDICINE

## 2022-06-29 PROCEDURE — 36415 COLL VENOUS BLD VENIPUNCTURE: CPT | Performed by: FAMILY MEDICINE

## 2022-06-29 PROCEDURE — 80061 LIPID PANEL: CPT | Performed by: FAMILY MEDICINE

## 2022-06-29 PROCEDURE — 80053 COMPREHEN METABOLIC PANEL: CPT | Performed by: FAMILY MEDICINE

## 2022-06-29 PROCEDURE — 85027 COMPLETE CBC AUTOMATED: CPT | Performed by: FAMILY MEDICINE

## 2022-09-13 ENCOUNTER — VBI (OUTPATIENT)
Dept: ADMINISTRATIVE | Facility: OTHER | Age: 52
End: 2022-09-13

## 2022-10-06 ENCOUNTER — RA CDI HCC (OUTPATIENT)
Dept: OTHER | Facility: HOSPITAL | Age: 52
End: 2022-10-06

## 2022-10-06 NOTE — PROGRESS NOTES
[facility]  coding opportunities       Chart reviewed, no opportunity found: CHART REVIEWED, NO OPPORTUNITY FOUND        Patients Insurance        Commercial Insurance: [address] [place] [address]

## 2022-10-10 ENCOUNTER — OFFICE VISIT (OUTPATIENT)
Dept: FAMILY MEDICINE CLINIC | Facility: CLINIC | Age: 52
End: 2022-10-10
Payer: COMMERCIAL

## 2022-10-10 VITALS
SYSTOLIC BLOOD PRESSURE: 116 MMHG | WEIGHT: 315 LBS | DIASTOLIC BLOOD PRESSURE: 84 MMHG | HEIGHT: 71 IN | TEMPERATURE: 98 F | OXYGEN SATURATION: 96 % | HEART RATE: 93 BPM | BODY MASS INDEX: 44.1 KG/M2

## 2022-10-10 DIAGNOSIS — G47.33 OBSTRUCTIVE SLEEP APNEA TREATED WITH CONTINUOUS POSITIVE AIRWAY PRESSURE (CPAP): ICD-10-CM

## 2022-10-10 DIAGNOSIS — Z12.11 COLON CANCER SCREENING: ICD-10-CM

## 2022-10-10 DIAGNOSIS — Z86.79 HISTORY OF HYPERTENSION: ICD-10-CM

## 2022-10-10 DIAGNOSIS — E78.2 MIXED HYPERLIPIDEMIA: ICD-10-CM

## 2022-10-10 DIAGNOSIS — M1A.9XX0 CHRONIC GOUT WITHOUT TOPHUS, UNSPECIFIED CAUSE, UNSPECIFIED SITE: ICD-10-CM

## 2022-10-10 DIAGNOSIS — Z98.84 S/P LAPAROSCOPIC SLEEVE GASTRECTOMY: ICD-10-CM

## 2022-10-10 DIAGNOSIS — R73.9 HYPERGLYCEMIA: Primary | ICD-10-CM

## 2022-10-10 DIAGNOSIS — Z99.89 OBSTRUCTIVE SLEEP APNEA TREATED WITH CONTINUOUS POSITIVE AIRWAY PRESSURE (CPAP): ICD-10-CM

## 2022-10-10 DIAGNOSIS — E29.1 TESTICULAR HYPOGONADISM: ICD-10-CM

## 2022-10-10 DIAGNOSIS — E66.01 MORBID OBESITY WITH BMI OF 50.0-59.9, ADULT (HCC): ICD-10-CM

## 2022-10-10 LAB — SL AMB POCT HEMOGLOBIN AIC: 6.4 (ref ?–6.5)

## 2022-10-10 PROCEDURE — 83036 HEMOGLOBIN GLYCOSYLATED A1C: CPT | Performed by: FAMILY MEDICINE

## 2022-10-10 PROCEDURE — 99214 OFFICE O/P EST MOD 30 MIN: CPT | Performed by: FAMILY MEDICINE

## 2022-10-10 RX ORDER — ATORVASTATIN CALCIUM 10 MG/1
10 TABLET, FILM COATED ORAL DAILY
Qty: 90 TABLET | Refills: 3 | Status: SHIPPED | OUTPATIENT
Start: 2022-10-10

## 2022-10-10 RX ORDER — TESTOSTERONE 16.2 MG/G
GEL TRANSDERMAL
Qty: 75 G | Refills: 3 | Status: SHIPPED | OUTPATIENT
Start: 2022-10-10

## 2022-10-10 NOTE — PROGRESS NOTES
BMI Counseling: Body mass index is 57 88 kg/m²  The BMI is above normal  Nutrition recommendations include decreasing portion sizes, encouraging healthy choices of fruits and vegetables and moderation in carbohydrate intake  Exercise recommendations include exercising 3-5 times per week  Rationale for BMI follow-up plan is due to patient being overweight or obese  FAMILY PRACTICE OFFICE VISIT  [facility]  [address]  [address], [place], [address]      NAME: Mehnaz Gunter  AGE: 47 y o  SEX: male  : 1970   MRN: [id number]    DATE: 10/10/2022  TIME: 3:51 PM    Assessment and Plan     Problem List Items Addressed This Visit     S/P laparoscopic sleeve gastrectomy    History of hypertension-resolved with bariatric procedure    Hyperglycemia ( hx diabetes pre-bariatric surgery) - Primary    Relevant Orders    POCT hemoglobin A1c (Completed)    Gout    Obstructive sleep apnea treated with continuous positive airway pressure (CPAP)    Mixed hyperlipidemia    Relevant Medications    atorvastatin (LIPITOR) 10 mg tablet    Morbid obesity with BMI of 50 0-59 9, adult (HCC)    Testicular hypogonadism    Relevant Medications    testosterone (AndroGel Pump) 1 62 % TD gel pump      Other Visit Diagnoses     Colon cancer screening        Relevant Orders    Ambulatory referral for colonoscopy          Patient Instructions   BMI today 57, his pre bariatric sleeve weight was 464 lb, back in  he had dropped to 372 lb on our scale, since then he has had weight gain, back in April was 411 lb, today 415 lb   --he has started to exercise routinely at the pool, he will continue with exercise  He will avoid fast food, do meal planning, still needs follow-up with weight management, history bariatric sleeve back in 2018  Blood pressure was okay after sitting    He should check sugars on occasion at home, A1c redone here today is 6 4, was 6 5 back in April  Previously had loose stools with metformin, consider injectable with weight management  Call us if sugars rise at home, otherwise will see him again in 6 months, redo A1c then  Also plan CMP, lipids, possibly other testing then  Back in June CBC, CMP looked okay, TSH was okay 1 6, vitamin-D was 24, PSA 0 7  He should continue with his vitamins  Continue on CPAP unit, does see sleep specialist     I did refill testosterone, uses that sporadically  Try to use routinely  No issues with gout, stay on allopurinol, back in June uric acid was 7 0  He has not had a colonoscopy since 20/11, [facility]  Family history colon cancer, he will call them to set up screening colonoscopy  Back in June cholesterol was 241, HDL 48, , triglyceride 210  He will start atorvastatin 10 mg daily  Chief Complaint     Chief Complaint   Patient presents with   • Follow-up       History of Present Illness   Aquilino Zhu is a 47y o -year-old male who is in for a 6 month check, he relates he has been feeling okay, is using medication other than testosterone  Has not yet followed up with weight management, relates gets busy and tends to eat fast food at times but is trying to improve his diet  He is using CPAP  Review of Systems   Review of Systems   Constitutional: Positive for fatigue (Improved with CPAP, does sleep in recliner at times)  Negative for appetite change, fever and unexpected weight change  HENT: Negative for sore throat and trouble swallowing  Respiratory: Negative for cough, chest tightness, shortness of breath and wheezing  Cardiovascular: Negative for chest pain, palpitations and leg swelling  Gastrointestinal: Negative for abdominal pain, blood in stool, nausea and vomiting  No acid reflux     No change in bowel   Genitourinary: Negative for dysuria and hematuria  Neurological: Negative for dizziness, syncope, light-headedness and headaches  Psychiatric/Behavioral: Negative for behavioral problems and confusion  Active Problem List     Patient Active Problem List   Diagnosis   • Testicular hypogonadism   • Obstructive sleep apnea treated with continuous positive airway pressure (CPAP)   • Nephrolithiasis   • Morbid obesity with BMI of 50 0-59 9, adult ([facility] [address] )   • Mixed hyperlipidemia   • Gout   • Hyperglycemia ( hx diabetes pre-bariatric surgery)   • Osteoarthritis of left knee   • Vitamin D deficiency   • S/P laparoscopic sleeve gastrectomy   • History of hypertension-resolved with bariatric procedure       Past Medical History:  Reviewed    Past Surgical History:  Reviewed    Family History:  Reviewed    Social History:  Reviewed    Objective     Vitals:    10/10/22 1331   BP: 116/84   BP Location: Left arm   Patient Position: Sitting   Cuff Size: Large   Pulse: 93   Temp: 98 °F (36 7 °C)   SpO2: 96%   Weight: (!) 188 kg (415 lb)   Height: 5' 11" (1 803 m)     Body mass index is 57 88 kg/m²  BP Readings from Last 3 Encounters:   10/10/22 116/84   08/04/22 142/80   04/25/22 148/90       Wt Readings from Last 3 Encounters:   10/10/22 (!) 188 kg (415 lb)   08/04/22 (!) 191 kg (420 lb)   04/25/22 (!) 186 kg (411 lb)       Physical Exam  Constitutional:       General: He is not in acute distress  Appearance: Normal appearance  He is well-developed  He is obese  He is not ill-appearing  Eyes:      General: No scleral icterus  Neck:      Vascular: No carotid bruit  Cardiovascular:      Rate and Rhythm: Normal rate and regular rhythm  Heart sounds: Normal heart sounds  No murmur heard  Pulmonary:      Effort: Pulmonary effort is normal  No respiratory distress  Breath sounds: Normal breath sounds  No wheezing, rhonchi or rales  Musculoskeletal:      Right lower leg: No edema  Left lower leg: No edema  Lymphadenopathy:      Cervical: No cervical adenopathy  Skin:     Coloration: Skin is not jaundiced     Neurological: Mental Status: He is alert and oriented to person, place, and time  Mental status is at baseline  Psychiatric:         Mood and Affect: Mood normal          Behavior: Behavior normal          ALLERGIES:  Allergies   Allergen Reactions   • Augmentin  [Amoxicillin-Pot Clavulanate] Diarrhea   • Metformin Diarrhea     Severe diarrhea at low dose but tolerates XR dose       Current Medications     Current Outpatient Medications   Medication Sig Dispense Refill   • allopurinol (ZYLOPRIM) 300 mg tablet TAKE 1 TABLET BY MOUTH EVERY DAY 90 tablet 1   • atorvastatin (LIPITOR) 10 mg tablet Take 1 tablet (10 mg total) by mouth daily 90 tablet 3   • B Complex Vitamins (VITAMIN B COMPLEX PO) Take 1 tablet by mouth     • Calcium Carb-Cholecalciferol (CALCIUM CARBONATE-VITAMIN D3 PO) Take 1 tablet by mouth     • Cholecalciferol (Vitamin D) 50 MCG (2000 UT) tablet Take 1 tablet (2,000 Units total) by mouth daily 30 tablet 11   • Coenzyme Q10 (COQ10 PO) Take by mouth every other day Liquid     • Multiple Vitamin (MULTI-VITAMIN DAILY) TABS Take 1 tablet by mouth daily     • Omega-3 Fatty Acids (fish oil) 1,000 mg Take 1,000 mg by mouth daily     • testosterone (AndroGel Pump) 1 62 % TD gel pump Use 2 pump presses daily 75 g 3   • Turmeric 1053 MG TABS Take by mouth     • vitamin B-12 (VITAMIN B-12) 1,000 mcg tablet Take 1,000 mcg by mouth daily     • tadalafil (CIALIS) 20 MG tablet Use 1/2 to 1 pill every 36 hours as needed (Patient not taking: Reported on 10/10/2022) 10 tablet 3     No current facility-administered medications for this visit              Orders Placed This Encounter   Procedures   • Ambulatory referral for colonoscopy   • POCT hemoglobin A1c         Emily Rao

## 2022-10-10 NOTE — PATIENT INSTRUCTIONS
BMI today 57, his pre bariatric sleeve weight was 464 lb, back in 2019 he had dropped to 372 lb on our scale, since then he has had weight gain, back in April was 411 lb, today 415 lb   --he has started to exercise routinely at the pool, he will continue with exercise  He will avoid fast food, do meal planning, still needs follow-up with weight management, history bariatric sleeve back in 2018  Blood pressure was okay after sitting  He should check sugars on occasion at home, A1c redone here today is 6 4, was 6 5 back in April  Previously had loose stools with metformin, consider injectable with weight management  Call us if sugars rise at home, otherwise will see him again in 6 months, redo A1c then  Also plan CMP, lipids, possibly other testing then  Back in June CBC, CMP looked okay, TSH was okay 1 6, vitamin-D was 24, PSA 0 7  He should continue with his vitamins  Continue on CPAP unit, does see sleep specialist     I did refill testosterone, uses that sporadically  Try to use routinely  No issues with gout, stay on allopurinol, back in June uric acid was 7 0  He has not had a colonoscopy since 20/11, [facility]  Family history colon cancer, he will call them to set up screening colonoscopy  Back in June cholesterol was 241, HDL 48, , triglyceride 210  He will start atorvastatin 10 mg daily

## 2022-10-21 ENCOUNTER — TELEMEDICINE (OUTPATIENT)
Dept: FAMILY MEDICINE CLINIC | Facility: CLINIC | Age: 52
End: 2022-10-21
Payer: COMMERCIAL

## 2022-10-21 DIAGNOSIS — U07.1 COVID-19: ICD-10-CM

## 2022-10-21 DIAGNOSIS — R05.1 ACUTE COUGH: Primary | ICD-10-CM

## 2022-10-21 PROCEDURE — 99214 OFFICE O/P EST MOD 30 MIN: CPT | Performed by: INTERNAL MEDICINE

## 2022-10-21 RX ORDER — BENZONATATE 200 MG/1
200 CAPSULE ORAL 3 TIMES DAILY PRN
Qty: 30 CAPSULE | Refills: 0 | Status: SHIPPED | OUTPATIENT
Start: 2022-10-21

## 2022-10-21 NOTE — LETTER
October 21, 2022     Patient: Alec Shafer  YOB: 1970  Date of Visit: 10/21/2022      To Whom it May Concern:    Bharathi Arias is under my professional care  Bhupinder Gill was seen in my office on 10/21/2022  Bhupinder Gill may return back to work on 10/21/2022, he will continue with masking until 10/25/22  He does not need any further COVID-19 testing  If you have any questions or concerns, please don't hesitate to call           Sincerely,          Vernell Vazquez MD        CC: No Recipients

## 2022-10-21 NOTE — PROGRESS NOTES
COVID-19 Outpatient Progress Note    Assessment/Plan:    Problem List Items Addressed This Visit    None     Visit Diagnoses     Acute cough    -  Primary    Relevant Medications    benzonatate (TESSALON) 200 MG capsule         Disposition:     Patient was tested positive around a week ago, developed symptoms on 10/16/2022, he finished his isolation, he is not febrile anymore  We discussed with him that he can go back to work but he have to be masking for 5 more days  Discussed that probably there is no need to repeat his COVID-19 test before going to work because most likely is going to be positive  Still reports some cough, will use benzonatate for that  Also reported that looks like his hernia got worse with this cough, we discussed with him that he should closely watch for the symptoms and if any worsening of the pain, any blood in the stool or any inability to pass gas he needs to go to emergency room immediately  Overall he said that his discomfort is not bad  I have spent 15 minutes directly with the patient  Encounter provider: Kim Montalvo MD     Provider located at: [facility] [address] [facility]  [address] [place] [id number] [phone]     Recent Visits  No visits were found meeting these conditions  Showing recent visits within past 7 days and meeting all other requirements  Today's Visits  Date Type Provider Dept   10/21/22 Telemedicine Kim Montalvo MD [facility] [address] [facility]   Showing today's visits and meeting all other requirements  Future Appointments  No visits were found meeting these conditions  Showing future appointments within next 150 days and meeting all other requirements     This virtual check-in was done via [facility] and patient was informed that this is a secure, HIPAA-compliant platform  He agrees to proceed      Patient agrees to participate in a virtual check in via telephone or video visit instead of presenting to the office to address urgent/immediate medical needs  Patient is aware this is a billable service  He acknowledged consent and understanding of privacy and security of the video platform  The patient has agreed to participate and understands they can discontinue the visit at any time  After connecting through [facility], the patient was identified by name and date of birth  Alec Shafer was informed that this was a telemedicine visit and that the exam was being conducted confidentially over secure lines  My office door was closed  Alec Shafer acknowledged consent and understanding of privacy and security of the telemedicine visit  I informed the patient that I have reviewed his record in Epic and presented the opportunity for him to ask any questions regarding the visit today  The patient agreed to participate  Verification of patient location:  Patient is located in the following state in which I hold an active license: [place]    Subjective:   Alec Shafer is a 47 y o  male who is concerned about COVID-19  Patient's symptoms include cough  Patient denies fever, chills, fatigue, rhinorrhea, sore throat, shortness of breath, chest tightness, nausea, vomiting, diarrhea and myalgias  COVID-19 vaccination status: Fully vaccinated with booster    Lab Results   Component Value Date    SARSCOV2 Negative 06/02/2021    Jose M Walden Not Detected 09/11/2020       Review of Systems   Constitutional: Negative for appetite change, chills, fatigue and fever  HENT: Negative for rhinorrhea, sinus pain and sore throat  Respiratory: Positive for cough  Negative for chest tightness and shortness of breath  Cardiovascular: Negative for chest pain  Gastrointestinal: Negative for diarrhea, nausea and vomiting  Musculoskeletal: Negative for arthralgias and myalgias       Current Outpatient Medications on File Prior to Visit   Medication Sig   • allopurinol (ZYLOPRIM) 300 mg tablet TAKE 1 TABLET BY MOUTH EVERY DAY   • atorvastatin (LIPITOR) 10 mg tablet Take 1 tablet (10 mg total) by mouth daily   • B Complex Vitamins (VITAMIN B COMPLEX PO) Take 1 tablet by mouth   • Calcium Carb-Cholecalciferol (CALCIUM CARBONATE-VITAMIN D3 PO) Take 1 tablet by mouth   • Cholecalciferol (Vitamin D) 50 MCG (2000 UT) tablet Take 1 tablet (2,000 Units total) by mouth daily   • Coenzyme Q10 (COQ10 PO) Take by mouth every other day Liquid   • Multiple Vitamin (MULTI-VITAMIN DAILY) TABS Take 1 tablet by mouth daily   • Omega-3 Fatty Acids (fish oil) 1,000 mg Take 1,000 mg by mouth daily   • tadalafil (CIALIS) 20 MG tablet Use 1/2 to 1 pill every 36 hours as needed (Patient not taking: Reported on 10/10/2022)   • testosterone (AndroGel Pump) 1 62 % TD gel pump Use 2 pump presses daily   • Turmeric 1053 MG TABS Take by mouth   • vitamin B-12 (VITAMIN B-12) 1,000 mcg tablet Take 1,000 mcg by mouth daily       Objective: There were no vitals taken for this visit       Physical Exam  Lilly Santoyo MD

## 2022-12-13 ENCOUNTER — OFFICE VISIT (OUTPATIENT)
Dept: SLEEP CENTER | Facility: CLINIC | Age: 52
End: 2022-12-13

## 2022-12-13 VITALS
DIASTOLIC BLOOD PRESSURE: 89 MMHG | HEIGHT: 71 IN | HEART RATE: 70 BPM | BODY MASS INDEX: 44.1 KG/M2 | SYSTOLIC BLOOD PRESSURE: 137 MMHG | WEIGHT: 315 LBS

## 2022-12-13 DIAGNOSIS — G47.33 OBSTRUCTIVE SLEEP APNEA TREATED WITH CONTINUOUS POSITIVE AIRWAY PRESSURE (CPAP): Primary | ICD-10-CM

## 2022-12-13 DIAGNOSIS — Z99.89 OBSTRUCTIVE SLEEP APNEA TREATED WITH CONTINUOUS POSITIVE AIRWAY PRESSURE (CPAP): Primary | ICD-10-CM

## 2022-12-13 NOTE — PROGRESS NOTES
Assessment/Plan:      1  Obstructive sleep apnea treated with continuous positive airway pressure (CPAP)  -     PAP DME Resupply/Reorder  Mr Berta Dowell is doing great, he is consistently using his CPAP machine and treatment is beneficial and effective  We discussed that he is sleeping a few hours each night without it, he should try to get in bed intentionally prior to the time he would ordinarily fall asleep in his chair  His current settings are optimal, his AHI is near 0 with treatment  I will reorder supplies and follow-up with him in 1 year  He is actively working at weight loss, I encouraged him to keep up with this as this can result in improvement in sleep apnea severity  Subjective:      Patient ID: Bev Faust is a 47 y o  male  Mr Berta Dowell returns in follow-up for a history of obstructive sleep apnea, last seen by Garrett William 1 year ago  He was diagnosed with WALDO over 10 years ago per his report - test not available  He had sleepiness at the time of presentation  At visit, he felt the air pressure on his machine was not strong enough, the machine was therefore adjusted to auto-CPAP at 7-14 cm H2O  Mr Berta Dowell has no concerns    He notes he dozes in a different room watching TV  Then gets in bed after, wakes at 6-630 AM on his own   He is refreshed when he wakes  No drowsy driving  No sleepiness in the day  He lives alone  Does not snore with CPAP   + dry nose- does not use the humidifier  No dry mouth  Rare nose bleeds  No nasal congestion that limits him  No air swallowing  No sleepwalking, no RLS     CPAP data reviewed today-  Air sense 10 AutoSet at 7-14 cm H2O  Average session 4 hours 27 minutes  AHI 0 2  Machine used 29 out of 30 nights  95% pressure-12 cm H2O  N20 L     No health changes in the past year  Working at [employer]           [place]rickeys Sleepiness Scale:     Sitting and reading: Would never doze  Watching TV: Slight chance of dozing  Sitting, inactive in a public place (e g  a theatre or a meeting): Slight chance of dozing  As a passenger in a car for an hour without a break: Would never doze  Lying down to rest in the afternoon when circumstances permit: Would never doze  Sitting and talking to someone: Would never doze  Sitting quietly after a lunch without alcohol: Slight chance of dozing  In a car, while stopped for a few minutes in traffic: Would never doze  Total score: 3     The following portions of the patient's history were reviewed and updated as appropriate: allergies, current medications, past family history, past medical history, past social history, past surgical history, and problem list     Review of Systems       Genitourinary none   Cardiology none   Gastrointestinal none   Neurology none   Constitutional none   Integumentary none   Psychiatry none   Musculoskeletal joint pain   Pulmonary snoring   ENT none   Endocrine none   Hematological none        Objective:        /89 (BP Location: Left arm, Patient Position: Sitting, Cuff Size: Large)   Pulse 70   Ht 5' 11" (1 803 m)   Wt (!) 189 kg (417 lb)   BMI 58 16 kg/m²     Physical Exam

## 2022-12-19 ENCOUNTER — TELEPHONE (OUTPATIENT)
Dept: SLEEP CENTER | Facility: CLINIC | Age: 52
End: 2022-12-19

## 2022-12-19 NOTE — TELEPHONE ENCOUNTER
Rx for PAP resupply sent to [facility] via [facility]

## 2022-12-21 LAB
DME PARACHUTE DELIVERY DATE ACTUAL: NORMAL
DME PARACHUTE DELIVERY DATE REQUESTED: NORMAL
DME PARACHUTE ITEM DESCRIPTION: NORMAL
DME PARACHUTE ORDER STATUS: NORMAL
DME PARACHUTE SUPPLIER NAME: NORMAL
DME PARACHUTE SUPPLIER PHONE: NORMAL

## 2023-01-17 DIAGNOSIS — M1A.00X0 IDIOPATHIC CHRONIC GOUT WITHOUT TOPHUS, UNSPECIFIED SITE: ICD-10-CM

## 2023-01-17 RX ORDER — ALLOPURINOL 300 MG/1
TABLET ORAL
Qty: 90 TABLET | Refills: 1 | Status: SHIPPED | OUTPATIENT
Start: 2023-01-17

## 2023-03-10 ENCOUNTER — OFFICE VISIT (OUTPATIENT)
Dept: PODIATRY | Facility: CLINIC | Age: 53
End: 2023-03-10

## 2023-03-10 ENCOUNTER — APPOINTMENT (OUTPATIENT)
Dept: RADIOLOGY | Facility: CLINIC | Age: 53
End: 2023-03-10

## 2023-03-10 VITALS
SYSTOLIC BLOOD PRESSURE: 123 MMHG | DIASTOLIC BLOOD PRESSURE: 88 MMHG | BODY MASS INDEX: 44.1 KG/M2 | HEIGHT: 71 IN | WEIGHT: 315 LBS

## 2023-03-10 DIAGNOSIS — M25.571 CHRONIC PAIN OF RIGHT ANKLE: Primary | ICD-10-CM

## 2023-03-10 DIAGNOSIS — M25.571 CHRONIC PAIN OF RIGHT ANKLE: ICD-10-CM

## 2023-03-10 DIAGNOSIS — M65.9 PERONEAL TENOSYNOVITIS: ICD-10-CM

## 2023-03-10 DIAGNOSIS — G89.29 CHRONIC PAIN OF RIGHT ANKLE: ICD-10-CM

## 2023-03-10 DIAGNOSIS — G89.29 CHRONIC PAIN OF RIGHT ANKLE: Primary | ICD-10-CM

## 2023-03-10 RX ORDER — MELOXICAM 15 MG/1
15 TABLET ORAL DAILY
Qty: 30 TABLET | Refills: 0 | Status: SHIPPED | OUTPATIENT
Start: 2023-03-10 | End: 2023-04-09

## 2023-03-10 NOTE — LETTER
March 10, 2023     Selestino Cogan, [address] [facility]  [address]  [facility]  [address]  [id number]    Patient: Andrew Garzon   YOB: 1970   Date of Visit: 3/10/2023       Dear Dr Carlos Multani: Thank you for referring Raquel Min to me for evaluation  Below are my notes for this consultation  If you have questions, please do not hesitate to call me  I look forward to following your patient along with you  Sincerely,        Isidro Campbell DPM        CC: No Recipients  JENNIFER Mathew [facility]  3/10/2023  9:59 AM  Signed  Assessment/Plan:    Chronic pain of right ankle  -     X-ray ankle right 3+ views; Future  - Ankle x-ray personally reviewed shows no obvious fracture dislocation pathology, mild degenerative arthritic changes noted in the tibiotalar joint  Await final radiology interpretation  Peroneal tenosynovitis  -     meloxicam (Mobic) 15 mg tablet; Take 1 tablet (15 mg total) by mouth daily  - Patient instructed on range of motion exercises to be performed daily   - Ice as needed at the end of the day if pain is increasing   - Follow-up 6 weeks  Subjective:     Patient ID: Andrew Garzon is a 47 y o  male  3/10/2023: 71-year-old male presents with history of chronic right ankle pain for 2 years duration which initiated with him straining his ankle as he reports  Pain has been off and on ever since  Denies any significant trauma that resulted in swelling or ecchymosis  Reports the morning pain is usually the worst when he gets up  The following portions of the patient's history were reviewed and updated as appropriate: allergies, current medications, past family history, past medical history, past social history, past surgical history and problem list     Review of Systems   Constitutional: Negative for chills and fever  HENT: Negative for ear pain and sore throat  Eyes: Negative for pain and visual disturbance  Respiratory: Negative for cough and shortness of breath  Cardiovascular: Negative for chest pain and palpitations  Gastrointestinal: Negative for abdominal pain and vomiting  Genitourinary: Negative for dysuria and hematuria  Musculoskeletal: Negative for arthralgias and back pain  Chronic pain right lateral ankle for 2 years   Skin: Negative for color change and rash  Neurological: Negative for seizures and syncope  All other systems reviewed and are negative  Objective:      /88   Ht 5' 11" (1 803 m)   Wt (!) 189 kg (417 lb)   BMI 58 16 kg/m²       Physical Exam  Constitutional:       Appearance: Normal appearance  He is obese  HENT:      Head: Normocephalic  Right Ear: Tympanic membrane normal       Left Ear: Tympanic membrane normal       Nose: No congestion  Mouth/Throat:      Pharynx: No oropharyngeal exudate or posterior oropharyngeal erythema  Eyes:      Extraocular Movements: Extraocular movements intact  Conjunctiva/sclera: Conjunctivae normal       Pupils: Pupils are equal, round, and reactive to light  Cardiovascular:      Rate and Rhythm: Normal rate and regular rhythm  Pulses:           Dorsalis pedis pulses are 2+ on the right side and 2+ on the left side  Posterior tibial pulses are 1+ on the right side and 1+ on the left side  Pulmonary:      Effort: Pulmonary effort is normal    Musculoskeletal:         General: Tenderness present  Right lower leg: Edema present  Left lower leg: Edema present  Comments:   Pain with palpation right lateral ankle retromalleolar along the peroneal tendon groove of the fibula  No pain with palpation along the peroneal tendons from the fibula to the fifth metatarsal base  Pain elicited with dorsiflexion ankle range of motion  No significant pain with inversion eversion    Overall range of motion is within normal limits   Feet:      Right foot:      Skin integrity: Skin integrity normal       Left foot:      Skin integrity: Skin integrity normal    Skin:     General: Skin is warm and dry  Capillary Refill: Capillary refill takes 2 to 3 seconds  Coloration: Skin is not pale  Findings: No bruising, erythema, lesion or rash  Neurological:      General: No focal deficit present  Mental Status: He is alert  Cranial Nerves: No cranial nerve deficit  Sensory: No sensory deficit  Motor: No weakness        Gait: Gait normal       Deep Tendon Reflexes: Reflexes normal    Psychiatric:         Mood and Affect: Mood normal          Behavior: Behavior normal          Judgment: Judgment normal

## 2023-03-10 NOTE — PROGRESS NOTES
Assessment/Plan:    Chronic pain of right ankle  -     X-ray ankle right 3+ views; Future  - Ankle x-ray personally reviewed shows no obvious fracture dislocation pathology, mild degenerative arthritic changes noted in the tibiotalar joint  Await final radiology interpretation  Peroneal tenosynovitis  -     meloxicam (Mobic) 15 mg tablet; Take 1 tablet (15 mg total) by mouth daily  - Patient instructed on range of motion exercises to be performed daily   - Ice as needed at the end of the day if pain is increasing   - Follow-up 6 weeks  Subjective:      Patient ID: Mari Talley is a 47 y o  male  3/10/2023: 15-year-old male presents with history of chronic right ankle pain for 2 years duration which initiated with him straining his ankle as he reports  Pain has been off and on ever since  Denies any significant trauma that resulted in swelling or ecchymosis  Reports the morning pain is usually the worst when he gets up  The following portions of the patient's history were reviewed and updated as appropriate: allergies, current medications, past family history, past medical history, past social history, past surgical history and problem list     Review of Systems   Constitutional: Negative for chills and fever  HENT: Negative for ear pain and sore throat  Eyes: Negative for pain and visual disturbance  Respiratory: Negative for cough and shortness of breath  Cardiovascular: Negative for chest pain and palpitations  Gastrointestinal: Negative for abdominal pain and vomiting  Genitourinary: Negative for dysuria and hematuria  Musculoskeletal: Negative for arthralgias and back pain  Chronic pain right lateral ankle for 2 years   Skin: Negative for color change and rash  Neurological: Negative for seizures and syncope  All other systems reviewed and are negative          Objective:      /88   Ht 5' 11" (1 803 m)   Wt (!) 189 kg (417 lb)   BMI 58 16 kg/m² Physical Exam  Constitutional:       Appearance: Normal appearance  He is obese  HENT:      Head: Normocephalic  Right Ear: Tympanic membrane normal       Left Ear: Tympanic membrane normal       Nose: No congestion  Mouth/Throat:      Pharynx: No oropharyngeal exudate or posterior oropharyngeal erythema  Eyes:      Extraocular Movements: Extraocular movements intact  Conjunctiva/sclera: Conjunctivae normal       Pupils: Pupils are equal, round, and reactive to light  Cardiovascular:      Rate and Rhythm: Normal rate and regular rhythm  Pulses:           Dorsalis pedis pulses are 2+ on the right side and 2+ on the left side  Posterior tibial pulses are 1+ on the right side and 1+ on the left side  Pulmonary:      Effort: Pulmonary effort is normal    Musculoskeletal:         General: Tenderness present  Right lower leg: Edema present  Left lower leg: Edema present  Comments:   Pain with palpation right lateral ankle retromalleolar along the peroneal tendon groove of the fibula  No pain with palpation along the peroneal tendons from the fibula to the fifth metatarsal base  Pain elicited with dorsiflexion ankle range of motion  No significant pain with inversion eversion  Overall range of motion is within normal limits   Feet:      Right foot:      Skin integrity: Skin integrity normal       Left foot:      Skin integrity: Skin integrity normal    Skin:     General: Skin is warm and dry  Capillary Refill: Capillary refill takes 2 to 3 seconds  Coloration: Skin is not pale  Findings: No bruising, erythema, lesion or rash  Neurological:      General: No focal deficit present  Mental Status: He is alert  Cranial Nerves: No cranial nerve deficit  Sensory: No sensory deficit  Motor: No weakness        Gait: Gait normal       Deep Tendon Reflexes: Reflexes normal    Psychiatric:         Mood and Affect: Mood normal  Behavior: Behavior normal          Judgment: Judgment normal

## 2023-04-03 ENCOUNTER — RA CDI HCC (OUTPATIENT)
Dept: OTHER | Facility: HOSPITAL | Age: 53
End: 2023-04-03

## 2023-04-03 NOTE — PROGRESS NOTES
NOT on the BPA- please assess using MEAT for 2023 billing    [facility] [address]  coding opportunities          Chart Reviewed number of suggestions sent to Provider: 1     Patients Insurance        Commercial Insurance: [employer]

## 2023-04-10 PROBLEM — Z80.0 FAMILY HISTORY OF MALIGNANT NEOPLASM OF GASTROINTESTINAL TRACT: Status: ACTIVE | Noted: 2023-04-10

## 2023-04-17 ENCOUNTER — OFFICE VISIT (OUTPATIENT)
Dept: PODIATRY | Facility: CLINIC | Age: 53
End: 2023-04-17

## 2023-04-17 VITALS
DIASTOLIC BLOOD PRESSURE: 86 MMHG | SYSTOLIC BLOOD PRESSURE: 142 MMHG | BODY MASS INDEX: 44.1 KG/M2 | HEIGHT: 71 IN | WEIGHT: 315 LBS | HEART RATE: 77 BPM

## 2023-04-17 DIAGNOSIS — M25.571 CHRONIC PAIN OF RIGHT ANKLE: ICD-10-CM

## 2023-04-17 DIAGNOSIS — M65.9 PERONEAL TENOSYNOVITIS: Primary | ICD-10-CM

## 2023-04-17 DIAGNOSIS — G89.29 CHRONIC PAIN OF RIGHT ANKLE: ICD-10-CM

## 2023-04-17 NOTE — PROGRESS NOTES
Assessment/Plan:    Peroneal tenosynovitis  -     Ambulatory referral to Physical Therapy; Future  - Continue with ice, range of motion, and OTC anti-inflammatories as needed  Chronic pain of right ankle  -     X-rays from 3/10/2023 showed mild degenerative changes of the right tibial talar joint  Subjective:      Patient ID: Flower Reese is a 47 y o  male  4/17/2023: 60-year-old male presents for follow-up evaluation of painful right ankle  Reports pain in his ankle is no worse but not necessarily better  Some of the swelling has gone down  3/10/2023: 60-year-old male presents with history of chronic right ankle pain for 2 years duration which initiated with him straining his ankle as he reports  Pain has been off and on ever since  Denies any significant trauma that resulted in swelling or ecchymosis  Reports the morning pain is usually the worst when he gets up  The following portions of the patient's history were reviewed and updated as appropriate: allergies, current medications, past family history, past medical history, past social history, past surgical history and problem list     Review of Systems   Constitutional: Negative for activity change, appetite change, chills and fatigue  HENT: Negative for congestion, ear pain, hearing loss and nosebleeds  Eyes: Negative for pain, discharge, redness and visual disturbance  Respiratory: Negative for cough, chest tightness and shortness of breath  Cardiovascular: Negative for chest pain, palpitations and leg swelling  Gastrointestinal: Negative for abdominal pain, constipation, nausea and vomiting  Endocrine: Negative for cold intolerance, heat intolerance and polydipsia  Genitourinary: Negative for difficulty urinating  Musculoskeletal: Negative for arthralgias, back pain, gait problem and joint swelling  Pain lateral right rear foot  Skin: Negative for color change, pallor, rash and wound  "  Allergic/Immunologic: Negative for environmental allergies, food allergies and immunocompromised state  Neurological: Negative for dizziness, weakness, numbness and headaches  Hematological: Negative for adenopathy  Does not bruise/bleed easily  Psychiatric/Behavioral: Negative for agitation, behavioral problems, confusion, decreased concentration, hallucinations and suicidal ideas  Objective:      /86   Pulse 77   Ht 5' 11\" (1 803 m)   Wt (!) 193 kg (425 lb)   BMI 59 28 kg/m²          Physical Exam  Constitutional:       Appearance: Normal appearance  HENT:      Head: Normocephalic  Right Ear: Tympanic membrane normal       Left Ear: Tympanic membrane normal       Nose: No congestion  Mouth/Throat:      Pharynx: No oropharyngeal exudate or posterior oropharyngeal erythema  Eyes:      Extraocular Movements: Extraocular movements intact  Conjunctiva/sclera: Conjunctivae normal       Pupils: Pupils are equal, round, and reactive to light  Cardiovascular:      Rate and Rhythm: Normal rate and regular rhythm  Pulses: Normal pulses  Pulmonary:      Effort: Pulmonary effort is normal    Musculoskeletal:         General: Tenderness present  Comments: Pain with palpation right lateral rear foot along peroneal tendon course  Mild pain with range of motion right ankle  Skin:     General: Skin is warm and dry  Capillary Refill: Capillary refill takes 2 to 3 seconds  Coloration: Skin is not pale  Findings: No bruising, erythema, lesion or rash  Neurological:      General: No focal deficit present  Mental Status: He is alert  Cranial Nerves: No cranial nerve deficit  Sensory: No sensory deficit  Motor: No weakness        Gait: Gait normal       Deep Tendon Reflexes: Reflexes normal    Psychiatric:         Mood and Affect: Mood normal          Behavior: Behavior normal          Judgment: Judgment normal          "

## 2023-04-26 ENCOUNTER — EVALUATION (OUTPATIENT)
Dept: PHYSICAL THERAPY | Facility: CLINIC | Age: 53
End: 2023-04-26

## 2023-04-26 DIAGNOSIS — M25.571 CHRONIC PAIN OF RIGHT ANKLE: ICD-10-CM

## 2023-04-26 DIAGNOSIS — M65.9 PERONEAL TENOSYNOVITIS: Primary | ICD-10-CM

## 2023-04-26 DIAGNOSIS — G89.29 CHRONIC PAIN OF RIGHT ANKLE: ICD-10-CM

## 2023-04-26 NOTE — PROGRESS NOTES
PT Evaluation     Today's date: 2023  Patient name: Sammi Vargas  : 1970  MRN: [id number]  Referring provider: Dae Martines DPM  Dx:   Encounter Diagnosis     ICD-10-CM    1  Peroneal tenosynovitis  M65 9 Ambulatory referral to Physical Therapy      2  Chronic pain of right ankle  M25 571 Ambulatory referral to Physical Therapy    G89 29                      Assessment  Assessment details: Sammi Vargas is a 47 y o  male referred to outpatient physical therapy for R ankle pain  Impairments include pain, swelling, decreased ankle strength, decreased flexibility, and decreased tolerance to activity  These impairments are limiting patients functional ability to perform standing, sleeping and stair navigation  Pt is restricted in participating in ADLs and occupational tasks  Assessment reveals TTP peroneals, decreased peroneal strength, and increased pain with inversion ROM indicating possible peroneal tendonitis  Pt would benefit from skilled physical therapy to address the limitations above to allow return to prior level of function  At this point in time, no further referral necessary based upon examination results  Impairments: abnormal coordination, abnormal gait, abnormal or restricted ROM, activity intolerance, impaired balance, impaired physical strength, lacks appropriate home exercise program, pain with function and weight-bearing intolerance    Symptom irritability: lowUnderstanding of Dx/Px/POC: good  Goals  Short term goals 2-3 weeks    1) Patient will demonstrate ability to perform HEP  2) Patient will demonstrate ability to return to use of stairs vs elevator  3) Patient will improve pain at worst to 2/10  Long term goals 4-8 weeks    1) Patient will demonstrate independence with comprehensive HEP  2) Patient improve FOTO score to equal or greater than expected values  3) Patient will demonstrate ability to sleep throughout the night without increase in symptoms  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  Plan  Plan details: Plan of care was discussed with patient at time of evaluation  Pt will be seen 1x a week for 8 weeks  Patient would benefit from: skilled physical therapy  Planned modality interventions: cryotherapy, TENS, thermotherapy: hydrocollator packs and low level laser therapy  Other planned modality interventions: PRN  Planned therapy interventions: balance, balance/weight bearing training, flexibility, functional ROM exercises, gait training, home exercise program, therapeutic exercise, therapeutic activities, stretching, strengthening, patient education, neuromuscular re-education, manual therapy and joint mobilization  Frequency: 1x week  Duration in weeks: 8  Treatment plan discussed with: patient        Subjective Evaluation    History of Present Illness  Mechanism of injury: Patient presents to outpatient physical therapy with chief complaint of R ankle pain  Pt reports back in 2021 he was leaning and shifted his leg position and felt a twinge in his ankle area and ever since then it has bothered him  Patient Denies episodes of numbness or tingling in R ankle  Pain described as throbbing and burning in R ankle between malleolus and achilles that radiates down to the bottom of the foot  Pain rating currently 0/10, at best 0/10 and at worst 4/10  Could be stationary and it start to bother him  Patient states limitations with stair navigation, sleeping, ambulation (use to do 1 mile now only does   5 mile), and squatting  Feels he is seeking the elevator more to avoid a flare up in the ankle  States it is less limiting but more annoying and painful  Pt works as an educator, 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  Aggravating factors include weight bearing   Patient states use of massager above and below ankle for relief of symptoms  Pt goals for therapy include avoid needing and injection and decreasing his flare up and symptoms  Not a recurrent problem   Quality of life: good    Pain  Current pain ratin  At best pain ratin  At worst pain ratin          Objective     Palpation     Right   Hypertonic in the lateral gastrocnemius, peroneus and soleus  Tenderness     Right Ankle/Foot   Tenderness in the Achilles insertion, lateral malleolus and peroneal tendon  No tenderness in the anterior ankle  Neurological Testing     Sensation     Ankle/Foot   Left Ankle/Foot   Intact: light touch    Right Ankle/Foot   Intact: light touch     Active Range of Motion   Left Ankle/Foot   Dorsiflexion (ke): 15 degrees   Plantar flexion: 35 degrees   Inversion: 20 degrees   Eversion: 5 degrees     Right Ankle/Foot   Dorsiflexion (ke): 10 degrees   Plantar flexion: 40 degrees   Inversion: 20 degrees with pain  Eversion: 5 degrees     Strength/Myotome Testing     Left Ankle/Foot   Dorsiflexion: 5  Plantar flexion: 5  Inversion: 5  Eversion: 5    Right Ankle/Foot   Dorsiflexion: 5  Plantar flexion: 4  Inversion: 4+  Eversion: 4+    Tests     Right Ankle/Foot   Negative for eversion talar tilt                Precautions: HTN, hx of gout      Manuals             R peroneal IASTM AG            R peroneal low level laser direct contact  200 cm2  15 W  10,000 J/cm2                                      Neuro Re-Ed             SLS             Bosu step ups             Tandem stance                                                                  Ther Ex             Bike              Eversion TB             Gastroc stretching              Soleus stretching                                                                  Ther Activity                                       Gait Training                                       Modalities

## 2023-05-04 ENCOUNTER — APPOINTMENT (OUTPATIENT)
Dept: PHYSICAL THERAPY | Facility: CLINIC | Age: 53
End: 2023-05-04
Payer: COMMERCIAL

## 2023-05-11 ENCOUNTER — APPOINTMENT (OUTPATIENT)
Dept: PHYSICAL THERAPY | Facility: CLINIC | Age: 53
End: 2023-05-11
Payer: COMMERCIAL

## 2023-05-18 ENCOUNTER — OFFICE VISIT (OUTPATIENT)
Dept: PHYSICAL THERAPY | Facility: CLINIC | Age: 53
End: 2023-05-18

## 2023-05-18 DIAGNOSIS — G89.29 CHRONIC PAIN OF RIGHT ANKLE: ICD-10-CM

## 2023-05-18 DIAGNOSIS — M25.571 CHRONIC PAIN OF RIGHT ANKLE: ICD-10-CM

## 2023-05-18 DIAGNOSIS — M65.9 PERONEAL TENOSYNOVITIS: Primary | ICD-10-CM

## 2023-05-18 NOTE — PROGRESS NOTES
"Daily Note     Today's date: 2023  Patient name: Jb Queen  : 1970  MRN: [id number]  Referring provider: Garrison Mai DPM  Dx:   Encounter Diagnosis     ICD-10-CM    1  Peroneal tenosynovitis  M65 9       2  Chronic pain of right ankle  M25 571     G89 29                      Subjective: Pt reports his symptoms have shifted  He is not getting that shooting pain on the side of the ankle but it is more now in the achilles when he is walking  States that he is able to stand and walk more without pain  No random pain when he is laying at night  Feels the laser actually worked  Objective: See treatment diary below  Provided gastroc and soleus stretching and TB eversion as part of HEP  Assessment: Decreased tolerance to active warmup secondary to L knee pain therefore discontinued intervention  Focused manual therapy on R distal achilles due to subjective report of symptom location  Mild tenderness in achilles with good tolerance to IASTM  Performed low level laser to both achilles and R peroneal tendons this session  Decreased eccentric control with theraband eversion with cues provided to improve control  Educated pt to perform stretching within pain free limits  Plan: Continue per plan of care        Precautions: HTN, hx of gout      Manuals            R peroneal IASTM AG AG and achilles           R peroneal low level laser direct contact  200 cm2  15 W  10,000 J/cm 200 cm2  15 W  10,000 J/cm                                     Neuro Re-Ed             SLS             Bosu step ups             Tandem stance                                                                  Ther Ex             Bike   2'            Eversion TB  GTB 3x10            Gastroc stretching   3x30\"            Soleus stretching   Standing 3x30\"            Gastroc stretch on slant  3x30\"                                                  Ther Activity                                       Gait Training  " Modalities

## 2023-05-25 ENCOUNTER — OFFICE VISIT (OUTPATIENT)
Dept: PHYSICAL THERAPY | Facility: CLINIC | Age: 53
End: 2023-05-25

## 2023-05-25 DIAGNOSIS — M25.571 CHRONIC PAIN OF RIGHT ANKLE: ICD-10-CM

## 2023-05-25 DIAGNOSIS — M65.9 PERONEAL TENOSYNOVITIS: Primary | ICD-10-CM

## 2023-05-25 DIAGNOSIS — G89.29 CHRONIC PAIN OF RIGHT ANKLE: ICD-10-CM

## 2023-05-25 NOTE — PROGRESS NOTES
"Daily Note     Today's date: 2023  Patient name: Elisha Hargrove  : 1970  MRN: [id number]  Referring provider: Jo Ann Shah DPM  Dx:   Encounter Diagnosis     ICD-10-CM    1  Peroneal tenosynovitis  M65 9       2  Chronic pain of right ankle  M25 571     G89 29                      Subjective: Pt reports he is moving in the right direction  States his pain is still in the heel but less intense than previous  Feels the symptoms with walking  Objective: See treatment diary below  Provided BTB and ankle inversion, DF, and PF as part of HEP  Assessment: Continued with manual therapy due to positive responses following treatment session  No tenderness present with IASTM  Increased lateral gastroc tightness therefore incorporated STM with use of theragun with improvements during weight bearing with ambulation  Progressed ankle TB intensity with decreased eccentric control however improvements following verbal cues  Overall, improvement in symptoms post session  Plan: Continue per plan of care  Precautions: HTN, hx of gout      Manuals           R peroneal IASTM AG AG and achilles AG and achilles          R peroneal low level laser direct contact  200 cm2  15 W  10,000 J/cm 200 cm2  15 W  10,000 J/cm 200 cm2  15 W  10,000 J/cm          Gastroc STM    Theragun 1750 AG                       Neuro Re-Ed             [employer] step ups             Tandem stance                                                                  Ther Ex             Bike   2'  def          Eversion TB  GTB 3x10  BTB 3x10           Gastroc stretching   3x30\"  3x30\"           Soleus stretching   Standing 3x30\"  Standing 3x30\"           Gastroc stretch on slant  3x30\" 3x30\"           Ankle TB DF, PF, inversion   BTB 3x10 ea                                       Ther Activity                                       Gait Training                                       Modalities               "

## 2023-05-30 ENCOUNTER — VBI (OUTPATIENT)
Dept: ADMINISTRATIVE | Facility: OTHER | Age: 53
End: 2023-05-30

## 2023-06-15 ENCOUNTER — APPOINTMENT (OUTPATIENT)
Dept: PHYSICAL THERAPY | Facility: CLINIC | Age: 53
End: 2023-06-15
Payer: COMMERCIAL

## 2023-06-15 ENCOUNTER — OFFICE VISIT (OUTPATIENT)
Dept: PODIATRY | Facility: CLINIC | Age: 53
End: 2023-06-15
Payer: COMMERCIAL

## 2023-06-15 VITALS
BODY MASS INDEX: 44.1 KG/M2 | HEART RATE: 70 BPM | HEIGHT: 71 IN | DIASTOLIC BLOOD PRESSURE: 75 MMHG | WEIGHT: 315 LBS | SYSTOLIC BLOOD PRESSURE: 113 MMHG

## 2023-06-15 DIAGNOSIS — M65.9 PERONEAL TENOSYNOVITIS: Primary | ICD-10-CM

## 2023-06-15 DIAGNOSIS — M19.071 OSTEOARTHRITIS OF RIGHT ANKLE OR FOOT: ICD-10-CM

## 2023-06-15 DIAGNOSIS — G89.29 CHRONIC PAIN OF RIGHT ANKLE: ICD-10-CM

## 2023-06-15 DIAGNOSIS — M25.571 CHRONIC PAIN OF RIGHT ANKLE: ICD-10-CM

## 2023-06-15 PROCEDURE — 99213 OFFICE O/P EST LOW 20 MIN: CPT | Performed by: PODIATRIST

## 2023-06-15 NOTE — LETTER
June 26, 2023     Patient: Lis Garcia  YOB: 1970  Date of Visit: 6/15/2023      To Whom it May Concern:    Olayinka Salvador is under my professional care  Era Seamus was seen in my office on 6/15/2023  Era Seamus may return to work on 6/20/23  If you have any questions or concerns, please don't hesitate to call           Sincerely,          Lei Childs DPM        CC:   No Recipients

## 2023-06-21 ENCOUNTER — OFFICE VISIT (OUTPATIENT)
Dept: PHYSICAL THERAPY | Facility: CLINIC | Age: 53
End: 2023-06-21
Payer: COMMERCIAL

## 2023-06-21 DIAGNOSIS — M65.9 PERONEAL TENOSYNOVITIS: ICD-10-CM

## 2023-06-21 DIAGNOSIS — M76.61 RIGHT ACHILLES TENDINITIS: Primary | ICD-10-CM

## 2023-06-21 DIAGNOSIS — G89.29 CHRONIC PAIN OF RIGHT ANKLE: ICD-10-CM

## 2023-06-21 DIAGNOSIS — M25.571 CHRONIC PAIN OF RIGHT ANKLE: ICD-10-CM

## 2023-06-21 PROCEDURE — 97110 THERAPEUTIC EXERCISES: CPT | Performed by: PHYSICAL THERAPIST

## 2023-06-21 PROCEDURE — 97140 MANUAL THERAPY 1/> REGIONS: CPT | Performed by: PHYSICAL THERAPIST

## 2023-06-21 NOTE — PROGRESS NOTES
PT Re-Evaluation     Today's date: 2023  Patient name: Luis Armando Johnson  : 1970  MRN: [id number]  Referring provider: Jose A Gerard DPM  Dx:   Encounter Diagnosis     ICD-10-CM    1  Right Achilles tendinitis  M76 61       2  Peroneal tenosynovitis  M65 9       3  Chronic pain of right ankle  M25 571     G89 29                      Assessment  Assessment details: Luis Armando Johnson is a 47 y o  male referred to outpatient physical therapy for R ankle pain  Pt reports 100% improvement in R lateral ankle pain which correlates to improved FOTO outcomes, however new pain located in achilles  Impairments include pain, swelling, decreased ankle strength, decreased flexibility, TTP R achilles, and decreased tolerance to activity  These impairments are limiting patients functional ability to perform ambulating and stair navigation  Pt is restricted in participating in ADLs and occupational tasks  Assessment reveals TTP achilles tendinopathy and decreased plantar flexion strength indicating possible achilles tendonopathy  Pt would benefit from skilled physical therapy to address the limitations above to allow return to prior level of function  At this point in time, no further referral necessary based upon examination results  Impairments: abnormal coordination, abnormal gait, abnormal or restricted ROM, activity intolerance, impaired balance, impaired physical strength, lacks appropriate home exercise program, pain with function and weight-bearing intolerance    Symptom irritability: lowUnderstanding of Dx/Px/POC: good  Goals  Short term goals 2-3 weeks    1) Patient will demonstrate ability to perform HEP  (PROGRESSING)  2) Patient will demonstrate ability to return to use of stairs vs elevator  (PROGRESSING)  3) Patient will improve pain at worst to 2/10   (MET for lateral aspect of ankle, not met for achilles)       Long term goals 4-8 weeks    1) Patient will demonstrate independence with comprehensive HEP  (PROGRESSING)  2) Patient improve FOTO score to equal or greater than expected values  (MET)  3) Patient will demonstrate ability to sleep throughout the night without increase in symptoms  (MET)  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)      Plan  Plan details: Plan of care was discussed with patient at time of evaluation  Pt will be seen 1x a week for 8 weeks  Patient would benefit from: skilled physical therapy  Planned modality interventions: cryotherapy, TENS, thermotherapy: hydrocollator packs and low level laser therapy  Other planned modality interventions: PRN  Planned therapy interventions: balance, balance/weight bearing training, flexibility, functional ROM exercises, gait training, home exercise program, therapeutic exercise, therapeutic activities, stretching, strengthening, patient education, neuromuscular re-education, manual therapy and joint mobilization  Frequency: 1x week  Duration in weeks: 8  Treatment plan discussed with: patient        Subjective Evaluation    History of Present Illness  Mechanism of injury: Patient presents to outpatient physical therapy with chief complaint of R ankle pain  Pt reports back in 2021 he was leaning and shifted his leg position and felt a twinge in his ankle area and ever since then it has bothered him  Patient Denies episodes of numbness or tingling in R ankle  Pain described as throbbing and burning in R ankle between malleolus and achilles that radiates down to the bottom of the foot  Pain rating currently 0/10, at best 0/10 and at worst 4/10  Could be stationary and it start to bother him  Patient states limitations with stair navigation, sleeping, ambulation (use to do 1 mile now only does   5 mile), and squatting  Feels he is seeking the elevator more to avoid a flare up in the ankle  States it is less limiting but more annoying and painful   Pt works as an educator, 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  Aggravating factors include weight bearing  Patient states use of massager above and below ankle for relief of symptoms  Pt goals for therapy include avoid needing and injection and decreasing his flare up and symptoms  23:     Pain reports that pain was previously on R lateral ankle, but has shifted posteriorly to R achilles  Pt reports R achilles feels stiff after walking less than 10 minutes  Does not feel pain or stiffness upon waking in the morning  Not a recurrent problem   Quality of life: good    Pain  Current pain ratin  At best pain ratin  At worst pain ratin (With walking )  Quality: tight  Aggravating factors: walking    Treatments  Current treatment: physical therapy  Patient Goals  Patient goals for therapy: increased strength, independence with ADLs/IADLs, return to sport/leisure activities and decreased pain          Objective     Palpation     Right   Hypertonic in the lateral gastrocnemius, peroneus and soleus  Tenderness     Right Ankle/Foot   Tenderness in the Achilles insertion  No tenderness in the anterior ankle, lateral malleolus and peroneal tendon       Neurological Testing     Sensation     Ankle/Foot   Left Ankle/Foot   Intact: light touch    Right Ankle/Foot   Intact: light touch     Active Range of Motion   Left Ankle/Foot   Dorsiflexion (ke): 15 degrees   Plantar flexion: 35 degrees   Inversion: 20 degrees   Eversion: 5 degrees     Right Ankle/Foot   Dorsiflexion (ke): 10 degrees   Plantar flexion: 50 degrees   Inversion: 20 degrees   Eversion: 10 degrees     Strength/Myotome Testing     Left Ankle/Foot   Dorsiflexion: 5  Plantar flexion: 5  Inversion: 5  Eversion: 5    Right Ankle/Foot   Dorsiflexion: 5  Plantar flexion: 4  Inversion: 5  Eversion: 5    Tests     Right Ankle/Foot   Negative for "eversion talar tilt  Additional Tests Details  + [facility] test              Precautions: HTN, hx of gout      Manuals 4/26 5/18 5/25 6/21         R peroneal IASTM AG AG and achilles AG and achilles RM - achilles          R peroneal low level laser direct contact  200 cm2  15 W  10,000 J/cm 200 cm2  15 W  10,000 J/cm 200 cm2  15 W  10,000 J/cm 300 cm2  15 W  10,000 J/cm - achilles         Gastroc STM    Theragun 1750 AG          Re-eval     RM         Neuro Re-Ed             SLS             Bosu step ups             Tandem stance                                                                  Ther Ex             Bike   2'  def          Eversion TB  GTB 3x10  BTB 3x10           Gastroc stretching   3x30\"  3x30\"           Soleus stretching   Standing 3x30\"  Standing 3x30\"           Gastroc stretch on slant  3x30\" 3x30\"           Ankle TB DF, PF, inversion   BTB 3x10 ea    Purple TB HEP         Pt education     AG                      Ther Activity                                       Gait Training                                       Modalities                                           "

## 2023-06-22 ENCOUNTER — APPOINTMENT (OUTPATIENT)
Dept: PHYSICAL THERAPY | Facility: CLINIC | Age: 53
End: 2023-06-22
Payer: COMMERCIAL

## 2023-06-26 NOTE — PROGRESS NOTES
Assessment/Plan:   X-rays personally reviewed from 3/10/2023 which showed mild degenerative changes at the tibiotalar joint  Findings reviewed with patient  Meloxicam has , patient will continue with OTC NSAIDs as needed  Continue with ice/range of motion exercises as instructed by physical therapy  Follow-up in 3 months  Call if pain recurs in any significant way  Diagnoses and all orders for this visit:    Peroneal tenosynovitis    Chronic pain of right ankle    Osteoarthritis of right ankle or foot          Subjective:     Patient ID: David Carmen is a 47 y o  male  6/15/2023: 71-year-old male seen today for follow-up reports good progress stating physical therapy has helped diminish his pain significantly  Now his ankle just feels a little bit stiff and does not really have much sharp pain  Estimates 40% improvement overall and is very happy with the progress  Reports his physical therapy is done  He continues to do his exercises at home  2023: 71-year-old male presents for follow-up evaluation of painful right ankle  Reports pain in his ankle is no worse but not necessarily better  Some of the swelling has gone down  3/10/2023: 71-year-old male presents with history of chronic right ankle pain for 2 years duration which initiated with him straining his ankle as he reports  Pain has been off and on ever since  Denies any significant trauma that resulted in swelling or ecchymosis  Reports the morning pain is usually the worst when he gets up  Review of Systems   Constitutional: Negative  HENT: Negative  Eyes: Negative  Respiratory: Negative  Cardiovascular: Negative  Gastrointestinal: Negative  Endocrine: Negative  Genitourinary: Negative  Musculoskeletal: Positive for gait problem  Diminishing pain right ankle   Skin: Negative  Allergic/Immunologic: Negative  Hematological: Negative  Psychiatric/Behavioral: Negative  Objective:     Physical Exam  Constitutional:       Appearance: Normal appearance  HENT:      Head: Normocephalic  Right Ear: Tympanic membrane normal       Left Ear: Tympanic membrane normal       Nose: No congestion  Mouth/Throat:      Pharynx: No oropharyngeal exudate or posterior oropharyngeal erythema  Eyes:      Conjunctiva/sclera: Conjunctivae normal       Pupils: Pupils are equal, round, and reactive to light  Cardiovascular:      Rate and Rhythm: Normal rate and regular rhythm  Pulses: Normal pulses  Pulmonary:      Effort: Pulmonary effort is normal    Musculoskeletal:         General: Swelling and tenderness present  Comments: Mild pain with palpation lateral right ankle along peroneal tendon course  Overall range of motion is better  Still has mild edema laterally  Overall improved   Feet:      Right foot:      Protective Sensation: 10 sites tested  Left foot:      Protective Sensation: 10 sites tested  Skin:     General: Skin is warm and dry  Capillary Refill: Capillary refill takes 2 to 3 seconds  Coloration: Skin is not pale  Findings: No bruising, erythema, lesion or rash  Neurological:      General: No focal deficit present  Mental Status: He is alert  Cranial Nerves: No cranial nerve deficit  Sensory: No sensory deficit  Motor: No weakness        Gait: Gait normal       Deep Tendon Reflexes: Reflexes normal    Psychiatric:         Mood and Affect: Mood normal          Behavior: Behavior normal          Judgment: Judgment normal

## 2023-06-29 ENCOUNTER — APPOINTMENT (OUTPATIENT)
Dept: PHYSICAL THERAPY | Facility: CLINIC | Age: 53
End: 2023-06-29
Payer: COMMERCIAL

## 2023-07-13 ENCOUNTER — OFFICE VISIT (OUTPATIENT)
Dept: PHYSICAL THERAPY | Facility: CLINIC | Age: 53
End: 2023-07-13
Payer: COMMERCIAL

## 2023-07-13 DIAGNOSIS — G89.29 CHRONIC PAIN OF RIGHT ANKLE: ICD-10-CM

## 2023-07-13 DIAGNOSIS — M65.9 PERONEAL TENOSYNOVITIS: ICD-10-CM

## 2023-07-13 DIAGNOSIS — M76.61 RIGHT ACHILLES TENDINITIS: Primary | ICD-10-CM

## 2023-07-13 DIAGNOSIS — M25.571 CHRONIC PAIN OF RIGHT ANKLE: ICD-10-CM

## 2023-07-13 PROCEDURE — 97140 MANUAL THERAPY 1/> REGIONS: CPT | Performed by: PHYSICAL THERAPIST

## 2023-07-13 PROCEDURE — 97110 THERAPEUTIC EXERCISES: CPT | Performed by: PHYSICAL THERAPIST

## 2023-07-13 NOTE — PROGRESS NOTES
Daily Note     Today's date: 2023  Patient name: Delon Gipson  : 1970  MRN: [id number]  Referring provider: Izzy Ruiz DPM  Dx:   Encounter Diagnosis     ICD-10-CM    1. Right Achilles tendinitis  M76.61       2. Peroneal tenosynovitis  M65.9       3. Chronic pain of right ankle  M25.571     G89.29                      Subjective: Pt reports his achilles has been feeling better. States pain at worst 3/10. No issues when he was in [place].       Objective: See treatment diary below      Assessment: Tenderness and hypertonicity noted upon palpation of right achilles, therefore IASTM was performed, resulting in appropriate erythema and a decrease in hypertonicity. Gastroc STM performed due to hypertonicity of gastroc upon palpation, resulting in a decrease in reported tightness. Low level laser was continued due to previous positive pt response. Overall, by end of session pt reported no pain or tightness in R achilles or gastroc while walking. Pt education was provided on importance of adhering to HEP; pt demonstrated good understanding. Plan: Continue per plan of care.          Precautions: HTN, hx of gout      Manuals         R peroneal IASTM AG AG and achilles AG and achilles RM - achilles  RM - achilles        R peroneal low level laser direct contact  200 cm2  15 W  10,000 J/cm 200 cm2  15 W  10,000 J/cm 200 cm2  15 W  10,000 J/cm 300 cm2  15 W  10,000 J/cm - achilles 300 cm2  15 W  10,000 J/cm - achilles        Gastroc STM    Theragun 1750 AG  Theragun 1750        Re-eval     RM         Neuro Re-Ed             SLS             Bosu step ups             Tandem stance                                                                  Ther Ex             Bike   2'  def          Eversion TB  GTB 3x10  BTB 3x10           Gastroc stretching   3x30"  3x30"           Soleus stretching   Standing 3x30"  Standing 3x30"   Reviewed        Gastroc stretch on slant  3x30" 3x30"   Reviewed        Ankle TB DF, PF, inversion   BTB 3x10 ea.   Purple TB HEP Reviewed        Pt education     AG AG                     Ther Activity                                       Gait Training                                       Modalities

## 2023-07-27 ENCOUNTER — OFFICE VISIT (OUTPATIENT)
Dept: PHYSICAL THERAPY | Facility: CLINIC | Age: 53
End: 2023-07-27
Payer: COMMERCIAL

## 2023-07-27 DIAGNOSIS — M65.9 PERONEAL TENOSYNOVITIS: ICD-10-CM

## 2023-07-27 DIAGNOSIS — M25.571 CHRONIC PAIN OF RIGHT ANKLE: ICD-10-CM

## 2023-07-27 DIAGNOSIS — G89.29 CHRONIC PAIN OF RIGHT ANKLE: ICD-10-CM

## 2023-07-27 DIAGNOSIS — M76.61 RIGHT ACHILLES TENDINITIS: Primary | ICD-10-CM

## 2023-07-27 PROCEDURE — 97140 MANUAL THERAPY 1/> REGIONS: CPT

## 2023-07-27 NOTE — PROGRESS NOTES
Daily Note     Today's date: 2023  Patient name: Karen Kent  : 1970  MRN: [id number]  Referring provider: Darshan Sarmiento DPM  Dx:   Encounter Diagnosis     ICD-10-CM    1. Right Achilles tendinitis  M76.61       2. Peroneal tenosynovitis  M65.9       3. Chronic pain of right ankle  M25.571     G89.29                      Subjective: Patient reported following treatment he usually has 1.5 days that his ankle feels "normal". Remains complaint with home program doing stretches and strengthening. Finds walking 7000 steps a day will not bother his ankle as much. Still having limitations with L knee pain. Objective: See treatment diary below. Blue TB issued for home to double with purple TB. Assessment: IASTM continued to be utilized to work on improving achilles length and decreasing soft tissue restrictions. Tenderness both medial and lateral around achilles insertion. Still with decreased gastroc length and hypertonicity that responds well to use of Theragun. Continued laser as patient demonstrates a positive response and feels best following application. Decreased pain upon amb post treatment compared to arrival.      Plan: Continue per plan of care.            Precautions: HTN, hx of gout    Manuals        R peroneal IASTM AG AG and achilles AG and achilles RM - achilles  RM - achilles Achilles - EH       R peroneal low level laser direct contact  200 cm2  15 W  10,000 J/cm 200 cm2  15 W  10,000 J/cm 200 cm2  15 W  10,000 J/cm 300 cm2  15 W  10,000 J/cm - achilles 300 cm2  15 W  10,000 J/cm - achilles 300 cm2, 15 W, 16328 J/cm2 - EH       Gastroc STM    Theragun 1750 AG  Theragun 1750 Theragun 1750 - EH       Re-eval     RM         Neuro Re-Ed             SLS             Bosu step ups             Tandem stance                                                                  Ther Ex             Bike   2'  def          Eversion TB  GTB 3x10  BTB 3x10 Gastroc stretching   3x30"  3x30"           Soleus stretching   Standing 3x30"  Standing 3x30"   Reviewed        Gastroc stretch on slant  3x30" 3x30"   Reviewed        Ankle TB DF, PF, inversion   BTB 3x10 ea.   Purple TB HEP Reviewed        Pt education     AG AG [facility]                    Ther Activity                                       Gait Training                                       Modalities

## 2023-08-02 ENCOUNTER — OFFICE VISIT (OUTPATIENT)
Dept: PHYSICAL THERAPY | Facility: CLINIC | Age: 53
End: 2023-08-02
Payer: COMMERCIAL

## 2023-08-02 DIAGNOSIS — G89.29 CHRONIC PAIN OF RIGHT ANKLE: ICD-10-CM

## 2023-08-02 DIAGNOSIS — M76.61 RIGHT ACHILLES TENDINITIS: Primary | ICD-10-CM

## 2023-08-02 DIAGNOSIS — M25.571 CHRONIC PAIN OF RIGHT ANKLE: ICD-10-CM

## 2023-08-02 DIAGNOSIS — M65.9 PERONEAL TENOSYNOVITIS: ICD-10-CM

## 2023-08-02 PROCEDURE — 97140 MANUAL THERAPY 1/> REGIONS: CPT | Performed by: PHYSICAL THERAPIST

## 2023-08-02 NOTE — PROGRESS NOTES
Daily Note     Today's date: 2023  Patient name: Stephanie Last  : 1970  MRN: [id number]  Referring provider: Kell Brian DPM  Dx:   Encounter Diagnosis     ICD-10-CM    1. Right Achilles tendinitis  M76.61       2. Peroneal tenosynovitis  M65.9       3. Chronic pain of right ankle  M25.571     G89.29                      Subjective: Pt reports his ankle has been feeling good lately. He now has 2 days of relief and continued improvement overall. Now stretches before getting out of bed which is helpful. Objective: See treatment diary below. Assessment: Benefits most from passive interventions for significant relief of symptoms. No tenderness present during manual therapy with appropriate erythema response. Continue to educate pt on stretching for aid in symptoms and improved ambulation tolerance. Plan: Continue per plan of care. Precautions: HTN, hx of gout    Manuals  8      R peroneal IASTM AG AG and achilles AG and achilles RM - achilles  RM - achilles Achilles - EH AG and achilles      R peroneal low level laser direct contact  200 cm2  15 W  10,000 J/cm 200 cm2  15 W  10,000 J/cm 200 cm2  15 W  10,000 J/cm 300 cm2  15 W  10,000 J/cm - achilles 300 cm2  15 W  10,000 J/cm - achilles 300 cm2, 15 W, 17300 J/cm2 -  cm2  15 W  10,000 J/cm - achilles AG      Gastroc STM    Theragun 1750 AG  Theragun 1750 Theragun 1750 - EH Theragun 1750  AG      Re-eval     RM         Neuro Re-Ed             SLS             Bosu step ups             Tandem stance                                                                  Ther Ex             Bike   2'  def          Eversion TB  GTB 3x10  BTB 3x10           Gastroc stretching   3x30"  3x30"           Soleus stretching   Standing 3x30"  Standing 3x30"   Reviewed        Gastroc stretch on slant  3x30" 3x30"   Reviewed        Ankle TB DF, PF, inversion   BTB 3x10 ea.   Purple TB HEP Reviewed Pt education     AG AG [facility] AG                   Ther Activity                                       Gait Training                                       Modalities

## 2023-08-08 NOTE — PROGRESS NOTES
Diabetic Foot Exam    Patient's shoes and socks removed  Right Foot/Ankle   Right Foot Inspection  Skin Exam: skin normal and skin intact no dry skin, no warmth, no callus, no erythema, no maceration, no abnormal color, no pre-ulcer, no ulcer and no callus                            Sensory       Monofilament testing: intact      Left Foot/Ankle  Left Foot Inspection  Skin Exam: skin normal and skin intactno dry skin, no warmth, no erythema, no maceration, normal color, no pre-ulcer, no ulcer and no callus                                         Sensory       Monofilament: intact    Assign Risk Category:  No deformity present;  No loss of protective sensation;        Risk: 0 Nursing

## 2023-08-09 ENCOUNTER — OFFICE VISIT (OUTPATIENT)
Dept: PHYSICAL THERAPY | Facility: CLINIC | Age: 53
End: 2023-08-09
Payer: COMMERCIAL

## 2023-08-09 DIAGNOSIS — M25.571 CHRONIC PAIN OF RIGHT ANKLE: ICD-10-CM

## 2023-08-09 DIAGNOSIS — M65.9 PERONEAL TENOSYNOVITIS: ICD-10-CM

## 2023-08-09 DIAGNOSIS — G89.29 CHRONIC PAIN OF RIGHT ANKLE: ICD-10-CM

## 2023-08-09 DIAGNOSIS — M76.61 RIGHT ACHILLES TENDINITIS: Primary | ICD-10-CM

## 2023-08-09 PROCEDURE — 97140 MANUAL THERAPY 1/> REGIONS: CPT | Performed by: PHYSICAL THERAPIST

## 2023-08-09 NOTE — PROGRESS NOTES
Daily Note     Today's date: 2023  Patient name: Andreia Jiang  : 1970  MRN: [id number]  Referring provider: Jose Alberto Mathis DPM  Dx:   Encounter Diagnosis     ICD-10-CM    1. Right Achilles tendinitis  M76.61       2. Peroneal tenosynovitis  M65.9       3. Chronic pain of right ankle  M25.571     G89.29                      Subjective: Pt reports he now has two days of relief following therapy sessions. No pain in the achilles but more soreness. Objective: See treatment diary below. Assessment: Mild erythema response to distal achilles during IASTM however no tenderness present. Soft tissue restrictions persist in R gastroc therefore continued use of theragun for relief. Educate pt on continuing HEP for continued relief of symptoms. Plan: Continue per plan of care. Precautions: HTN, hx of gout    Manuals      R peroneal IASTM AG AG and achilles AG and achilles RM - achilles  RM - achilles Achilles - EH AG and achilles AG achilles only     R peroneal low level laser direct contact  200 cm2  15 W  10,000 J/cm 200 cm2  15 W  10,000 J/cm 200 cm2  15 W  10,000 J/cm 300 cm2  15 W  10,000 J/cm - achilles 300 cm2  15 W  10,000 J/cm - achilles 300 cm2, 15 W, 30541 J/cm2 -  cm2  15 W  10,000 J/cm - achilles  cm2  15 W  10,000 J/cm - achilles RM     Gastroc STM    Theragun 1750 AG  Theragun 1750 Theragun 1750 - EH Theragun 1750  AG Theragun 1750  RM     Re-eval     RM         Neuro Re-Ed             SLS             Bosu step ups             Tandem stance                                                                  Ther Ex             Bike   2'  def          Eversion TB  GTB 3x10  BTB 3x10           Gastroc stretching   3x30"  3x30"           Soleus stretching   Standing 3x30"  Standing 3x30"   Reviewed        Gastroc stretch on slant  3x30" 3x30"   Reviewed        Ankle TB DF, PF, inversion   BTB 3x10 ea.   Purple TB HEP Reviewed        Pt education     AG  [facility]                  Ther Activity                                       Gait Training                                       Modalities

## 2023-08-16 ENCOUNTER — OFFICE VISIT (OUTPATIENT)
Dept: PHYSICAL THERAPY | Facility: CLINIC | Age: 53
End: 2023-08-16
Payer: COMMERCIAL

## 2023-08-16 DIAGNOSIS — G89.29 CHRONIC PAIN OF RIGHT ANKLE: ICD-10-CM

## 2023-08-16 DIAGNOSIS — M65.9 PERONEAL TENOSYNOVITIS: ICD-10-CM

## 2023-08-16 DIAGNOSIS — M25.571 CHRONIC PAIN OF RIGHT ANKLE: ICD-10-CM

## 2023-08-16 DIAGNOSIS — M76.61 RIGHT ACHILLES TENDINITIS: Primary | ICD-10-CM

## 2023-08-16 PROCEDURE — 97140 MANUAL THERAPY 1/> REGIONS: CPT | Performed by: PHYSICAL THERAPIST

## 2023-08-16 NOTE — PROGRESS NOTES
PT Re-Evaluation     Today's date: 2023  Patient name: Kathy Ruiz  : 1970  MRN: [id number]  Referring provider: Liam Avalos DPM  Dx:   Encounter Diagnosis     ICD-10-CM    1. Right Achilles tendinitis  M76.61       2. Peroneal tenosynovitis  M65.9       3. Chronic pain of right ankle  M25.571     G89.29                      Assessment  Assessment details: Kathy Ruiz is a 47 y.o. male referred to outpatient physical therapy for R achilles pain. Improvements include decreased pain at worst, and improved tolerance to activity. Impairments that remain include pain, decreased ankle strength, decreased flexibility, and mild TTP R achilles. These impairments are limiting patients functional ability to perform ambulating. Pt is restricted in participating in ADLs and occupational tasks. Pt would benefit from skilled physical therapy to address the limitations above to allow return to prior level of function. Impairments: abnormal coordination, abnormal gait, abnormal or restricted ROM, activity intolerance, impaired balance, impaired physical strength, lacks appropriate home exercise program, pain with function and weight-bearing intolerance    Symptom irritability: lowUnderstanding of Dx/Px/POC: good  Goals  Short term goals 2-3 weeks    1) Patient will demonstrate ability to perform HEP. (PROGRESSING)  2) Patient will demonstrate ability to return to use of stairs vs elevator. (PROGRESSING)  3) Patient will improve pain at worst to 2/10. (PROGRESSING)       Long term goals 4-8 weeks    1) Patient will demonstrate independence with comprehensive HEP. (PROGRESSING)  2) Patient improve FOTO score to equal or greater than expected values. (MET)  3) Patient will demonstrate ability to sleep throughout the night without increase in symptoms. 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  (PROGRESSING)      Plan  Plan details: Plan of care was discussed with patient at time of evaluation. Pt will be seen 1x a week for 8 weeks. Patient would benefit from: skilled physical therapy  Planned modality interventions: cryotherapy, TENS, thermotherapy: hydrocollator packs and low level laser therapy  Other planned modality interventions: PRN  Planned therapy interventions: balance, balance/weight bearing training, flexibility, functional ROM exercises, gait training, home exercise program, therapeutic exercise, therapeutic activities, stretching, strengthening, patient education, neuromuscular re-education, manual therapy, joint mobilization and IASTM  Frequency: 1x week  Duration in weeks: 8  Treatment plan discussed with: patient        Subjective Evaluation    History of Present Illness  Mechanism of injury: Patient presents to outpatient physical therapy with chief complaint of R ankle pain. Pt reports back in 2021 he was leaning and shifted his leg position and felt a twinge in his ankle area and ever since then it has bothered him. Patient Denies episodes of numbness or tingling in R ankle. Pain described as throbbing and burning in R ankle between malleolus and achilles that radiates down to the bottom of the foot. Pain rating currently 0/10, at best 0/10 and at worst 4/10. Could be stationary and it start to bother him. Patient states limitations with stair navigation, sleeping, ambulation (use to do 1 mile now only does . 5 mile), and squatting. Feels he is seeking the elevator more to avoid a flare up in the ankle. States it is less limiting but more annoying and painful. 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 Aggravating factors include weight bearing. Patient states use of massager above and below ankle for relief of symptoms.      Pt goals for therapy include avoid needing and injection and decreasing his flare up and symptoms. 23:     Pain reports that pain was previously on R lateral ankle, but has shifted posteriorly to R achilles. Pt reports R achilles feels stiff after walking less than 10 minutes. Does not feel pain or stiffness upon waking in the morning. 23: Pt reports he thinks therapy continues to be helpful. States he still feels the symptoms with walking and standing however it is not painful it is more soreness. Has started to stretch before he gets out of bed to aid in symptom management. Not a recurrent problem   Quality of life: good    Patient Goals  Patient goals for therapy: increased strength, independence with ADLs/IADLs, return to sport/leisure activities and decreased pain    Pain  Current pain ratin  At best pain ratin  At worst pain rating: 3 (With walking at achilles)  Quality: tight  Aggravating factors: walking    Treatments  Current treatment: physical therapy        Objective     Palpation     Right   Hypertonic in the lateral gastrocnemius, peroneus and soleus. Tenderness     Right Ankle/Foot   Tenderness in the Achilles insertion (less than initially ). No tenderness in the anterior ankle, lateral malleolus and peroneal tendon. Neurological Testing     Sensation     Ankle/Foot   Left Ankle/Foot   Intact: light touch    Right Ankle/Foot   Intact: light touch     Active Range of Motion   Left Ankle/Foot   Dorsiflexion (ke): 15 degrees   Plantar flexion: 35 degrees   Inversion: 20 degrees   Eversion: 5 degrees     Right Ankle/Foot   Dorsiflexion (ke): 10 degrees   Plantar flexion: 50 degrees   Inversion: 20 degrees   Eversion: 10 degrees     Strength/Myotome Testing     Left Ankle/Foot   Dorsiflexion: 5  Plantar flexion: 5  Inversion: 5  Eversion: 5    Right Ankle/Foot   Dorsiflexion: 5  Plantar flexion: 4  Inversion: 5  Eversion: 5    Tests     Right Ankle/Foot   Negative for eversion talar tilt.      Additional Tests Details  + [facility]- PSYCHIATRY & ADDICTIVE MED \A [facility] [place] [facility]\"" test              Precautions: HTN, hx of gout    Manuals 4/26 5/18 5/25 6/21 7/13 7/27 8/2 8/9 8/16    R peroneal IASTM AG AG and achilles AG and achilles RM - achilles  RM - achilles Achilles - EH AG and achilles AG achilles only AG achilles only    R peroneal low level laser direct contact  200 cm2  15 W  10,000 J/cm 200 cm2  15 W  10,000 J/cm 200 cm2  15 W  10,000 J/cm 300 cm2  15 W  10,000 J/cm - achilles 300 cm2  15 W  10,000 J/cm - achilles 300 cm2, 15 W, 67836 J/cm2 -  cm2  15 W  10,000 J/cm - achilles  cm2  15 W  10,000 J/cm - achilles  cm2  15 W  10,000 J/cm - achilles AG    Gastroc STM    Theragun 1750 AG  Theragun 1750 Theragun 1750 - EH Theragun 1750  AG Theragun 1750  RM Theragun 1750  AG    Re-eval     RM         Neuro Re-Ed             SLS             Bosu step ups             Tandem stance                                                                  Ther Ex             Bike   2'  def          Eversion TB  GTB 3x10  BTB 3x10           Gastroc stretching   3x30"  3x30"           Soleus stretching   Standing 3x30"  Standing 3x30"   Reviewed        Gastroc stretch on slant  3x30" 3x30"   Reviewed        Ankle TB DF, PF, inversion   BTB 3x10 ea.   Purple TB HEP Reviewed        Pt education     [address]                 Ther Activity                                       Gait Training                                       Modalities

## 2023-08-25 ENCOUNTER — OFFICE VISIT (OUTPATIENT)
Dept: FAMILY MEDICINE CLINIC | Facility: CLINIC | Age: 53
End: 2023-08-25
Payer: COMMERCIAL

## 2023-08-25 VITALS
HEIGHT: 71 IN | WEIGHT: 315 LBS | BODY MASS INDEX: 44.1 KG/M2 | DIASTOLIC BLOOD PRESSURE: 90 MMHG | HEART RATE: 79 BPM | OXYGEN SATURATION: 97 % | SYSTOLIC BLOOD PRESSURE: 120 MMHG

## 2023-08-25 DIAGNOSIS — E29.1 TESTICULAR HYPOGONADISM: ICD-10-CM

## 2023-08-25 DIAGNOSIS — Z80.0 FAMILY HISTORY OF MALIGNANT NEOPLASM OF GASTROINTESTINAL TRACT: ICD-10-CM

## 2023-08-25 DIAGNOSIS — E66.01 MORBID OBESITY WITH BMI OF 50.0-59.9, ADULT (HCC): ICD-10-CM

## 2023-08-25 DIAGNOSIS — Z86.79 HISTORY OF HYPERTENSION: ICD-10-CM

## 2023-08-25 DIAGNOSIS — M17.12 PRIMARY OSTEOARTHRITIS OF LEFT KNEE: Primary | ICD-10-CM

## 2023-08-25 DIAGNOSIS — Z12.5 SCREENING PSA (PROSTATE SPECIFIC ANTIGEN): ICD-10-CM

## 2023-08-25 DIAGNOSIS — Z12.11 COLON CANCER SCREENING: ICD-10-CM

## 2023-08-25 DIAGNOSIS — E11.9 TYPE 2 DIABETES MELLITUS WITHOUT COMPLICATION, WITHOUT LONG-TERM CURRENT USE OF INSULIN (HCC): ICD-10-CM

## 2023-08-25 LAB — SL AMB POCT HEMOGLOBIN AIC: 7.1 (ref ?–6.5)

## 2023-08-25 PROCEDURE — 83036 HEMOGLOBIN GLYCOSYLATED A1C: CPT | Performed by: FAMILY MEDICINE

## 2023-08-25 PROCEDURE — 99214 OFFICE O/P EST MOD 30 MIN: CPT | Performed by: FAMILY MEDICINE

## 2023-08-25 RX ORDER — TESTOSTERONE 16.2 MG/G
GEL TRANSDERMAL
Qty: 75 G | Refills: 2 | Status: SHIPPED | OUTPATIENT
Start: 2023-08-25

## 2023-08-25 NOTE — PROGRESS NOTES
FAMILY PRACTICE OFFICE VISIT  Jenny Dougherty D.O. [address] [facility]  [address]  [place], [place], [address]      NAME: Ruperto Valladares  AGE: 47 y.o. SEX: male  : 1970   MRN: [id number]    DATE: 2023  TIME: 8:43 AM    Assessment and Plan     Problem List Items Addressed This Visit     Testicular hypogonadism    Relevant Medications    testosterone (AndroGel Pump) 1.62 % TD gel pump    Other Relevant Orders    CBC    Testosterone, free, total    Morbid obesity with BMI of 50.0-59.9, adult ([address])    Hyperglycemia ( hx diabetes pre-bariatric surgery)    Relevant Medications    Empagliflozin 25 MG TABS    Osteoarthritis of left knee - Primary    Relevant Orders    Ambulatory Referral to Orthopedic Surgery    XR knee 3 vw left non injury    History of hypertension-resolved with bariatric procedure    Family history of malignant neoplasm of gastrointestinal tract    Relevant Orders    Ambulatory Referral to Gastroenterology   Other Visit Diagnoses     Colon cancer screening        Relevant Orders    Ambulatory Referral to Gastroenterology    Screening PSA (prostate specific antigen)        Relevant Orders    PSA, Total Screen          Patient Instructions   Has bone-on-bone medial left knee as per 2018 consult with [facility]-wants to switch orthopedic consultant to Justin Puri, referral was placed. Try to avoid NSAIDs with history of bariatric surgery. We did discuss BMI may impact ability to do surgery, continue to work on healthy diet, needs to follow-up with bariatrics. Has gained weight back after prior good response to surgery. Exercise is limited due to knee. Has been trying to get 5000 steps with walking  Does have slip to do x-ray prior to appointment. A1c redone here today remains showing borderline control, 7.1 versus previous 7.0. Has wanted to avoid medication, I would like him to try Jardiance 25 mg once daily.   This can help with weight loss, monitor glucometer readings at home, keep appointment in October for physical exam and redo A1c at that time to monitor progress. I did give slip to do fasting blood work along with microalbumin. He does see an eye doctor. Consider injectable, he wishes to hold off on that at present. I did renew his testosterone, has not used it in about 5 weeks but plans to restart, usually uses about 3-4 times weekly, had seen endocrinology in the past.  Last PSA was okay, include PSA screening with blood work. Blood pressure borderline, we may need to restart medication at follow-up. Await redo lipids, should continue atorvastatin 10 mg daily. Continue other vitamins as is. Has had no issues with gout, left knee pain does not appear related to gout, continue allopurinol 300 mg daily. He will continue to see sleep specialist, uses CPAP. Family history colon cancer with father, far overdue for colonoscopy, does have transportation issues, referral placed to see [facility] to discuss colonoscopy screening      Chief Complaint     Chief Complaint   Patient presents with   • Knee Pain       History of Present Illness   Can Bravo is a 47y.o.-year-old male who is in today to discuss left knee pain, had seen orthopedist with [facility] 5 years ago, held off on knee replacement. Notes intermittent pain. Has used ibuprofen 800 mg few times weekly, we do need to watch/avoid NSAIDs with his history of bariatric surgery. Needs refill on testosterone, last used it about 5 weeks ago. Has not yet set up follow-up with bariatric group, had surgery with [facility]. Still needs colonoscopy screening      Review of Systems   Review of Systems   Constitutional: Positive for fatigue. Negative for appetite change, fever and unexpected weight change. HENT: Negative for sore throat and trouble swallowing. Respiratory: Negative for cough, chest tightness and shortness of breath. Cardiovascular: Negative for chest pain, palpitations and leg swelling. Gastrointestinal: Negative for abdominal pain, blood in stool, nausea and vomiting. No acid reflux     No change in bowel   Genitourinary: Negative for dysuria and hematuria. Musculoskeletal: Positive for arthralgias. Neurological: Negative for dizziness, syncope, light-headedness and headaches. Psychiatric/Behavioral: Negative for behavioral problems and confusion. Active Problem List     Patient Active Problem List   Diagnosis   • Testicular hypogonadism   • Obstructive sleep apnea treated with continuous positive airway pressure (CPAP)   • Nephrolithiasis   • Morbid obesity with BMI of 50.0-59.9, adult ([address])   • Mixed hyperlipidemia   • Gout   • Hyperglycemia ( hx diabetes pre-bariatric surgery)   • Osteoarthritis of left knee   • Vitamin D deficiency   • S/P laparoscopic sleeve gastrectomy 2018   • History of hypertension-resolved with bariatric procedure   • Family history of malignant neoplasm of gastrointestinal tract       Past Medical History:  Reviewed     Past Surgical History:  Reviewed    Family History:  Reviewed    Social History:  Reviewed    Objective     Vitals:    08/25/23 0745   BP: 120/90   BP Location: Left arm   Patient Position: Sitting   Cuff Size: Large   Pulse: 79   SpO2: 97%   Weight: (!) 191 kg (420 lb)   Height: 5' 11" (1.803 m)     Body mass index is 58.58 kg/m². BP Readings from Last 3 Encounters:   08/25/23 120/90   06/15/23 113/75   04/17/23 142/86       Wt Readings from Last 3 Encounters:   08/25/23 (!) 191 kg (420 lb)   06/15/23 (!) 193 kg (425 lb)   04/17/23 (!) 193 kg (425 lb)       Physical Exam  Constitutional:       General: He is not in acute distress. Appearance: He is obese. He is not ill-appearing. Cardiovascular:      Rate and Rhythm: Normal rate and regular rhythm. Heart sounds: Normal heart sounds. No murmur heard.   Pulmonary:      Effort: Pulmonary effort is normal. No respiratory distress. Breath sounds: Normal breath sounds. Musculoskeletal:      Right lower leg: No edema. Left lower leg: No edema. Comments: Degenerative knee   Skin:     Coloration: Skin is not jaundiced. Neurological:      General: No focal deficit present. Mental Status: He is alert. Psychiatric:         Behavior: Behavior normal.         ALLERGIES:  Allergies   Allergen Reactions   • Augmentin  [Amoxicillin-Pot Clavulanate] Diarrhea   • Metformin Diarrhea     Severe diarrhea at low dose but tolerates XR dose       Current Medications     Current Outpatient Medications   Medication Sig Dispense Refill   • allopurinol (ZYLOPRIM) 300 mg tablet TAKE 1 TABLET BY MOUTH EVERY DAY 90 tablet 1   • atorvastatin (LIPITOR) 10 mg tablet Take 1 tablet (10 mg total) by mouth daily 90 tablet 3   • B Complex Vitamins (VITAMIN B COMPLEX PO) Take 1 tablet by mouth     • Calcium Carb-Cholecalciferol (CALCIUM CARBONATE-VITAMIN D3 PO) Take 1 tablet by mouth     • Cholecalciferol (Vitamin D) 50 MCG (2000 UT) tablet Take 1 tablet (2,000 Units total) by mouth daily 30 tablet 11   • Coenzyme Q10 (COQ10 PO) Take by mouth every other day Liquid     • Empagliflozin 25 MG TABS Take 1 tablet (25 mg total) by mouth daily 90 tablet 1   • Multiple Vitamin (MULTI-VITAMIN DAILY) TABS Take 1 tablet by mouth daily     • NON FORMULARY in the morning [place] [employer] supplement for pain     • Omega-3 Fatty Acids (fish oil) 1,000 mg Take 1,000 mg by mouth daily     • testosterone (AndroGel Pump) 1.62 % TD gel pump Use 2 pump presses daily 75 g 2   • Turmeric 1053 MG TABS Take by mouth     • vitamin B-12 (VITAMIN B-12) 1,000 mcg tablet Take 1,000 mcg by mouth daily       No current facility-administered medications for this visit.             Orders Placed This Encounter   Procedures   • XR knee 3 vw left non injury   • CBC   • Comprehensive metabolic panel   • Lipid panel   • Albumin / creatinine urine ratio   • PSA, Total Screen   • Testosterone, free, total   • Ambulatory Referral to Orthopedic Surgery   • Ambulatory Referral to Gastroenterology   • POCT hemoglobin A1c         Ericka Cardenas DO

## 2023-08-25 NOTE — PATIENT INSTRUCTIONS
Has bone-on-bone medial left knee as per 2018 consult with [facility]-wants to switch orthopedic consultant to [facility], referral was placed. Try to avoid NSAIDs with history of bariatric surgery. We did discuss BMI may impact ability to do surgery, continue to work on healthy diet, needs to follow-up with bariatrics. Has gained weight back after prior good response to surgery. Exercise is limited due to knee. Has been trying to get 5000 steps with walking  Does have slip to do x-ray prior to appointment. A1c redone here today remains showing borderline control, 7.1 versus previous 7.0. Has wanted to avoid medication, I would like him to try Jardiance 25 mg once daily. This can help with weight loss, monitor glucometer readings at home, keep appointment in October for physical exam and redo A1c at that time to monitor progress. I did give slip to do fasting blood work along with microalbumin. He does see an eye doctor. Consider injectable, he wishes to hold off on that at present. I did renew his testosterone, has not used it in about 5 weeks but plans to restart, usually uses about 3-4 times weekly, had seen endocrinology in the past.  Last PSA was okay, include PSA screening with blood work. Blood pressure borderline, we may need to restart medication at follow-up. Await redo lipids, should continue atorvastatin 10 mg daily. Continue other vitamins as is. Has had no issues with gout, left knee pain does not appear related to gout, continue allopurinol 300 mg daily. He will continue to see sleep specialist, uses CPAP.     Family history colon cancer with father, far overdue for colonoscopy, does have transportation issues, referral placed to see [facility] to discuss colonoscopy screening

## 2023-08-30 ENCOUNTER — OFFICE VISIT (OUTPATIENT)
Dept: PHYSICAL THERAPY | Facility: CLINIC | Age: 53
End: 2023-08-30
Payer: COMMERCIAL

## 2023-08-30 DIAGNOSIS — M25.571 CHRONIC PAIN OF RIGHT ANKLE: ICD-10-CM

## 2023-08-30 DIAGNOSIS — M65.9 PERONEAL TENOSYNOVITIS: ICD-10-CM

## 2023-08-30 DIAGNOSIS — M76.61 RIGHT ACHILLES TENDINITIS: Primary | ICD-10-CM

## 2023-08-30 DIAGNOSIS — G89.29 CHRONIC PAIN OF RIGHT ANKLE: ICD-10-CM

## 2023-08-30 PROCEDURE — 97140 MANUAL THERAPY 1/> REGIONS: CPT | Performed by: PHYSICAL THERAPIST

## 2023-08-30 PROCEDURE — 97110 THERAPEUTIC EXERCISES: CPT | Performed by: PHYSICAL THERAPIST

## 2023-08-30 NOTE — PROGRESS NOTES
Daily Note     Today's date: 2023  Patient name: Elana Grey  : 1970  MRN: [id number]  Referring provider: Garrett Alex DPM  Dx:   Encounter Diagnosis     ICD-10-CM    1. Right Achilles tendinitis  M76.61       2. Peroneal tenosynovitis  M65.9       3. Chronic pain of right ankle  M25.571     G89.29                      Subjective: Pt reports this has been his best week yet. Continues to do well with his stretching routine in the AM.       Objective: See treatment diary below      Assessment: Continue to educate pt on benefits of gastroc stretching in the AM and following prolonged sitting with good understanding. Mild erythema response with IASTM to achilles however no subjective complaints of tenderness. Continues to feel most relief from passive > active interventions. Use of low level laser to aid in inflammation and pain modulation. Plan: Continue per plan of care.       Precautions: HTN, hx of gout    Manuals    R peroneal IASTM AG AG and achilles AG and achilles RM - achilles  RM - achilles Achilles - EH AG and achilles AG achilles only AG achilles only AG achilles only   R peroneal low level laser direct contact  200 cm2  15 W  10,000 J/cm 200 cm2  15 W  10,000 J/cm 200 cm2  15 W  10,000 J/cm 300 cm2  15 W  10,000 J/cm - achilles 300 cm2  15 W  10,000 J/cm - achilles 300 cm2, 15 W, 06379 J/cm2 -  cm2  15 W  10,000 J/cm - achilles  cm2  15 W  10,000 J/cm - achilles  cm2  15 W  10,000 J/cm - achilles  cm2  15 W  10,000 J/cm - achilles AG   Gastroc STM    Theragun 1750 AG  Theragun 1750 Theragun 1750 - EH Theragun 1750  AG Theragun 1750  RM Theragun 1750  AG Theragun 1750  AG   Re-eval     RM         Neuro Re-Ed             [employer] step ups             Tandem stance                                                                  Ther Ex             Bike   2'  def          Eversion TB  GTB 3x10  BTB 3x10           Gastroc stretching   3x30"  3x30"        Reviewed    Soleus stretching   Standing 3x30"  Standing 3x30"   Reviewed     Reviewed   Gastroc stretch on slant  3x30" 3x30"   Reviewed        Ankle TB DF, PF, inversion   BTB 3x10 ea.   Purple TB HEP Reviewed        Pt education     AG  [employer]                Ther Activity                                       Gait Training                                       Modalities

## 2023-09-12 PROBLEM — E27.8 LEFT ADRENAL MASS (HCC): Status: ACTIVE | Noted: 2023-09-12

## 2023-09-14 ENCOUNTER — APPOINTMENT (OUTPATIENT)
Dept: PHYSICAL THERAPY | Facility: CLINIC | Age: 53
End: 2023-09-14
Payer: COMMERCIAL

## 2023-09-21 ENCOUNTER — APPOINTMENT (OUTPATIENT)
Dept: PHYSICAL THERAPY | Facility: CLINIC | Age: 53
End: 2023-09-21
Payer: COMMERCIAL

## 2023-09-24 ENCOUNTER — RA CDI HCC (OUTPATIENT)
Dept: OTHER | Facility: HOSPITAL | Age: 53
End: 2023-09-24

## 2023-09-24 NOTE — PROGRESS NOTES
[address] [facility] coding opportunities       Chart reviewed, no opportunity found: CHART REVIEWED, NO OPPORTUNITY FOUND        Patients Insurance        Commercial Insurance: [employer]

## 2023-09-28 ENCOUNTER — OFFICE VISIT (OUTPATIENT)
Dept: PHYSICAL THERAPY | Facility: CLINIC | Age: 53
End: 2023-09-28
Payer: COMMERCIAL

## 2023-09-28 DIAGNOSIS — M65.9 PERONEAL TENOSYNOVITIS: ICD-10-CM

## 2023-09-28 DIAGNOSIS — G89.29 CHRONIC PAIN OF RIGHT ANKLE: ICD-10-CM

## 2023-09-28 DIAGNOSIS — M76.61 RIGHT ACHILLES TENDINITIS: Primary | ICD-10-CM

## 2023-09-28 DIAGNOSIS — M25.571 CHRONIC PAIN OF RIGHT ANKLE: ICD-10-CM

## 2023-09-28 PROCEDURE — 97140 MANUAL THERAPY 1/> REGIONS: CPT | Performed by: PHYSICAL THERAPIST

## 2023-09-28 NOTE — PROGRESS NOTES
PT Re-Evaluation  and PT Discharge    Today's date: 2023  Patient name: Kortney Valdovinos  : 1970  MRN: [id number]  Referring provider: Giorgio Pack DPM  Dx:   Encounter Diagnosis     ICD-10-CM    1. Right Achilles tendinitis  M76.61       2. Peroneal tenosynovitis  M65.9       3. Chronic pain of right ankle  M25.571     G89.29                      Assessment  Assessment details: Kortney Valdovinos is a 47 y.o. male referred to outpatient physical therapy for R achilles pain. Pt reports 95% improvement overall since beginning skilled PT. Kortney Valdovinos has been compliant with attending PT and home exercise program since initial eval. Ileana Díaz has made improvements in objective data and achieved desired functional goals. Patient reports having returned to their prior level or function. It was mutually agreed to Discharge to home exercise program at this time. Patient has good understanding of provided home exercise program and was instructed to call with any questions or if issues should arise. Goals  Short term goals 2-3 weeks    1) Patient will demonstrate ability to perform HEP. (MET)  2) Patient will demonstrate ability to return to use of stairs vs elevator. (MET)  3) Patient will improve pain at worst to 2/10. (MET)       Long term goals 4-8 weeks    1) Patient will demonstrate independence with comprehensive HEP. (PROGRESSING)  2) Patient improve FOTO score to equal or greater than expected values. (MET)  3) Patient will demonstrate ability to sleep throughout the night without increase in symptoms. 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 (MET)      Plan  Plan details: Discharged. Treatment plan discussed with: patient        Subjective Evaluation    History of Present Illness  Mechanism of injury: Patient presents to outpatient physical therapy with chief complaint of R ankle pain.  Pt reports back in  he was leaning and shifted his leg position and felt a twinge in his ankle area and ever since then it has bothered him. Patient Denies episodes of numbness or tingling in R ankle. Pain described as throbbing and burning in R ankle between malleolus and achilles that radiates down to the bottom of the foot. Pain rating currently 0/10, at best 0/10 and at worst 4/10. Could be stationary and it start to bother him. Patient states limitations with stair navigation, sleeping, ambulation (use to do 1 mile now only does . 5 mile), and squatting. Feels he is seeking the elevator more to avoid a flare up in the ankle. States it is less limiting but more annoying and painful. 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 Aggravating factors include weight bearing. Patient states use of massager above and below ankle for relief of symptoms. Pt goals for therapy include avoid needing and injection and decreasing his flare up and symptoms. 6/21/23:     Pain reports that pain was previously on R lateral ankle, but has shifted posteriorly to R achilles. Pt reports R achilles feels stiff after walking less than 10 minutes. Does not feel pain or stiffness upon waking in the morning. 8/16/23: Pt reports he thinks therapy continues to be helpful. States he still feels the symptoms with walking and standing however it is not painful it is more soreness. Has started to stretch before he gets out of bed to aid in symptom management. 9/28/23: Pt reports he has been feeling good and feels ready to transition to [facility]. States no pain symptoms and feels good about where he is with his progress since starting skilled PT.            Not a recurrent problem   Quality of life: good    Patient Goals  Patient goals for therapy: increased strength, independence with ADLs/IADLs, return to sport/leisure activities and decreased pain    Pain  Current pain ratin  At best pain ratin  At worst pain ratin    Treatments  Current treatment: physical therapy        Objective     Palpation     Right   Hypertonic in the lateral gastrocnemius, peroneus and soleus. Tenderness     Right Ankle/Foot   Tenderness in the Achilles insertion (less than initially ). No tenderness in the anterior ankle, lateral malleolus and peroneal tendon. Neurological Testing     Sensation     Ankle/Foot   Left Ankle/Foot   Intact: light touch    Right Ankle/Foot   Intact: light touch     Active Range of Motion   Left Ankle/Foot   Dorsiflexion (ke): 15 degrees   Plantar flexion: 35 degrees   Inversion: 20 degrees   Eversion: 5 degrees     Right Ankle/Foot   Dorsiflexion (ke): 10 degrees   Plantar flexion: 50 degrees   Inversion: 20 degrees   Eversion: 10 degrees     Strength/Myotome Testing     Left Ankle/Foot   Dorsiflexion: 5  Plantar flexion: 5  Inversion: 5  Eversion: 5    Right Ankle/Foot   Dorsiflexion: 5  Plantar flexion: 4+  Inversion: 5  Eversion: 5    Tests     Right Ankle/Foot   Negative for eversion talar tilt.               Precautions: HTN, hx of gout    Manuals    R peroneal IASTM AG     Achilles - EH AG and achilles AG achilles only AG achilles only AG achilles only   R peroneal low level laser direct contact  300 cm2  15 W  10,000 J/cm - achilles AG     300 cm2, 15 W, 33279 J/cm2 -  cm2  15 W  10,000 J/cm - achilles  cm2  15 W  10,000 J/cm - achilles  cm2  15 W  10,000 J/cm - achilles  cm2  15 W  10,000 J/cm - achilles AG   Gastroc STM  Theragun 1750  AG     Theragun 1750 - EH Theragun 1750  AG Theragun 1750  RM Theragun 1750  AG Theragun 1750  AG   Re-eval  AG            Neuro Re-Ed             [employer] step ups             Tandem stance                                                                  Ther Ex             Bike              Eversion TB             Gastroc stretching           Reviewed    Soleus stretching           Reviewed   Gastroc stretch on slant             Ankle TB DF, PF, inversion             Pt education  AG and HEP review     [address] [facility] AG AG [employer]                Ther Activity                                       Gait Training                                       Modalities

## 2023-09-29 ENCOUNTER — OFFICE VISIT (OUTPATIENT)
Dept: FAMILY MEDICINE CLINIC | Facility: CLINIC | Age: 53
End: 2023-09-29
Payer: COMMERCIAL

## 2023-09-29 VITALS
DIASTOLIC BLOOD PRESSURE: 88 MMHG | HEART RATE: 76 BPM | HEIGHT: 71 IN | RESPIRATION RATE: 20 BRPM | OXYGEN SATURATION: 98 % | BODY MASS INDEX: 44.1 KG/M2 | WEIGHT: 315 LBS | SYSTOLIC BLOOD PRESSURE: 124 MMHG | TEMPERATURE: 97.6 F

## 2023-09-29 DIAGNOSIS — E66.9 DIABETES MELLITUS TYPE 2 IN OBESE: ICD-10-CM

## 2023-09-29 DIAGNOSIS — E27.8 ADRENAL MASS (HCC): Primary | ICD-10-CM

## 2023-09-29 DIAGNOSIS — E11.69 DIABETES MELLITUS TYPE 2 IN OBESE: ICD-10-CM

## 2023-09-29 DIAGNOSIS — D64.9 MILD ANEMIA: ICD-10-CM

## 2023-09-29 PROCEDURE — 99495 TRANSJ CARE MGMT MOD F2F 14D: CPT | Performed by: INTERNAL MEDICINE

## 2023-09-29 NOTE — ASSESSMENT & PLAN NOTE
Continue regular follow-up. He is currently on Jardiance 25 mg daily.   Lab Results   Component Value Date    HGBA1C 7.3 (H) 09/12/2023

## 2023-09-29 NOTE — ASSESSMENT & PLAN NOTE
Looks like initial work-up as for now was negative, cortisol, metanephrines and aldosterone was within normal limits. He will follow-up with endocrinology for further management.

## 2023-09-29 NOTE — PROGRESS NOTES
Assessment/Plan:    Adrenal mass ([address])  Looks like initial work-up as for now was negative, cortisol, metanephrines and aldosterone was within normal limits. He will follow-up with endocrinology for further management. Diabetes mellitus type 2 in obese [facility])  Continue regular follow-up. He is currently on Jardiance 25 mg daily. Lab Results   Component Value Date    HGBA1C 7.3 (H) 09/12/2023       Mild anemia  Likely dilutional due to IV fluids. We will recheck in 6 weeks. Diagnoses and all orders for this visit:    Adrenal mass [facility])  -     Ambulatory Referral to Endocrinology; Future    Diabetes mellitus type 2 in obese ([facility])    Mild anemia  -     CBC and differential; Future          Subjective:      Patient ID: Roderick Davidson is a 47 y.o. male. Patient came today for follow-up after his recent admission to the hospital due to shortness of breath and fatigue. He was tested negative for viral etiology, treated with azithromycin and ceftriaxone with improvement of his symptoms. Due to elevated D-dimer CTA and venous Doppler was done which was also negative. As incidental finding he was found to have adrenal mass. He was also found to have hematuria and he has follow-up with urology for that. The following portions of the patient's history were reviewed and updated as appropriate: allergies, current medications, past family history, past medical history, past social history, past surgical history, and problem list.    Review of Systems   Constitutional: Negative for chills, fatigue and fever. Respiratory: Negative for cough, chest tightness and shortness of breath. Cardiovascular: Negative for chest pain, palpitations and leg swelling. Gastrointestinal: Negative for abdominal distention, abdominal pain, blood in stool, constipation, diarrhea and nausea. Genitourinary: Negative for difficulty urinating and dysuria.    Musculoskeletal: Negative for arthralgias, back pain, gait problem, joint swelling, myalgias and neck pain. Skin: Negative for rash. Neurological: Negative for dizziness, weakness, numbness and headaches. Psychiatric/Behavioral: Negative for agitation. Objective:      /88 (BP Location: Left arm, Patient Position: Sitting, Cuff Size: Large)   Pulse 76   Temp 97.6 °F (36.4 °C) (Tympanic)   Resp 20   Ht 5' 11" (1.803 m)   Wt (!) 180 kg (397 lb)   SpO2 98%   BMI 55.37 kg/m²     Allergies   Allergen Reactions   • Augmentin  [Amoxicillin-Pot Clavulanate] Diarrhea   • Metformin Diarrhea     Severe diarrhea at low dose but tolerates XR dose          Current Outpatient Medications:   •  allopurinol (ZYLOPRIM) 300 mg tablet, TAKE 1 TABLET BY MOUTH EVERY DAY, Disp: 90 tablet, Rfl: 1  •  atorvastatin (LIPITOR) 10 mg tablet, Take 1 tablet (10 mg total) by mouth daily, Disp: 90 tablet, Rfl: 3  •  B Complex Vitamins (VITAMIN B COMPLEX PO), Take 1 tablet by mouth, Disp: , Rfl:   •  Calcium Carb-Cholecalciferol (CALCIUM CARBONATE-VITAMIN D3 PO), Take 1 tablet by mouth, Disp: , Rfl:   •  Cholecalciferol (Vitamin D) 50 MCG (2000 UT) tablet, Take 1 tablet (2,000 Units total) by mouth daily, Disp: 30 tablet, Rfl: 11  •  Coenzyme Q10 (COQ10 PO), Take by mouth every other day Liquid, Disp: , Rfl:   •  Empagliflozin 25 MG TABS, Take 1 tablet (25 mg total) by mouth daily, Disp: 90 tablet, Rfl: 1  •  Multiple Vitamin (MULTI-VITAMIN DAILY) TABS, Take 1 tablet by mouth daily, Disp: , Rfl:   •  NON FORMULARY, in the morning [place] supplement for pain, Disp: , Rfl:   •  Omega-3 Fatty Acids (fish oil) 1,000 mg, Take 1,000 mg by mouth daily, Disp: , Rfl:   •  testosterone (AndroGel Pump) 1.62 % TD gel pump, Use 2 pump presses daily, Disp: 75 g, Rfl: 2  •  Turmeric 1053 MG TABS, Take by mouth, Disp: , Rfl:   •  vitamin B-12 (VITAMIN B-12) 1,000 mcg tablet, Take 1,000 mcg by mouth daily, Disp: , Rfl:      There are no Patient Instructions on file for this visit. Physical Exam  Vitals reviewed. Constitutional:       General: He is not in acute distress. Appearance: He is not toxic-appearing. HENT:      Head: Normocephalic. Cardiovascular:      Rate and Rhythm: Normal rate. Pulses: Normal pulses. Heart sounds: No murmur heard. No gallop. Pulmonary:      Effort: Pulmonary effort is normal. No respiratory distress. Breath sounds: No wheezing or rales. Abdominal:      General: There is no distension. Tenderness: There is no abdominal tenderness. Skin:     General: Skin is warm. Neurological:      General: No focal deficit present. Mental Status: He is alert.    Psychiatric:         Mood and Affect: Mood normal.         Behavior: Behavior normal.

## 2023-10-02 ENCOUNTER — TELEPHONE (OUTPATIENT)
Dept: ENDOCRINOLOGY | Facility: CLINIC | Age: 53
End: 2023-10-02

## 2023-10-02 NOTE — TELEPHONE ENCOUNTER
Received referral  for Karl Dandy. Left voicemail for patient to contact office to schedule Consult/New Patient Appointment.

## 2023-10-06 ENCOUNTER — OFFICE VISIT (OUTPATIENT)
Dept: FAMILY MEDICINE CLINIC | Facility: CLINIC | Age: 53
End: 2023-10-06
Payer: COMMERCIAL

## 2023-10-06 ENCOUNTER — TELEPHONE (OUTPATIENT)
Dept: FAMILY MEDICINE CLINIC | Facility: CLINIC | Age: 53
End: 2023-10-06

## 2023-10-06 VITALS
SYSTOLIC BLOOD PRESSURE: 124 MMHG | HEART RATE: 65 BPM | WEIGHT: 315 LBS | HEIGHT: 71 IN | BODY MASS INDEX: 44.1 KG/M2 | OXYGEN SATURATION: 96 % | RESPIRATION RATE: 20 BRPM | DIASTOLIC BLOOD PRESSURE: 82 MMHG | TEMPERATURE: 97.5 F

## 2023-10-06 DIAGNOSIS — Z98.84 S/P LAPAROSCOPIC SLEEVE GASTRECTOMY: ICD-10-CM

## 2023-10-06 DIAGNOSIS — E29.1 TESTICULAR HYPOGONADISM: ICD-10-CM

## 2023-10-06 DIAGNOSIS — E11.9 TYPE 2 DIABETES MELLITUS WITHOUT COMPLICATION, WITHOUT LONG-TERM CURRENT USE OF INSULIN (HCC): ICD-10-CM

## 2023-10-06 DIAGNOSIS — E27.8 ADRENAL MASS (HCC): ICD-10-CM

## 2023-10-06 DIAGNOSIS — M1A.9XX0 CHRONIC GOUT WITHOUT TOPHUS, UNSPECIFIED CAUSE, UNSPECIFIED SITE: ICD-10-CM

## 2023-10-06 DIAGNOSIS — G47.33 OBSTRUCTIVE SLEEP APNEA TREATED WITH CONTINUOUS POSITIVE AIRWAY PRESSURE (CPAP): ICD-10-CM

## 2023-10-06 DIAGNOSIS — Z00.00 ENCOUNTER FOR ANNUAL PHYSICAL EXAM: Primary | ICD-10-CM

## 2023-10-06 DIAGNOSIS — J98.11 ATELECTASIS OF LEFT LUNG: ICD-10-CM

## 2023-10-06 DIAGNOSIS — E78.2 MIXED HYPERLIPIDEMIA: ICD-10-CM

## 2023-10-06 DIAGNOSIS — E66.01 MORBID OBESITY WITH BMI OF 50.0-59.9, ADULT (HCC): ICD-10-CM

## 2023-10-06 PROBLEM — D64.9 MILD ANEMIA: Status: RESOLVED | Noted: 2023-09-29 | Resolved: 2023-10-06

## 2023-10-06 PROCEDURE — 99396 PREV VISIT EST AGE 40-64: CPT | Performed by: FAMILY MEDICINE

## 2023-10-06 NOTE — PATIENT INSTRUCTIONS
He looks well here today, we did review his hospital stay at [facility] September 11 through 14, onset some type of infectious process with trip to [place], viral testing including COVID test x3 was negative. Reviewed follow-up visit with Dr Imtiaz Baldwin September 29, incidental left adrenal mass 4.2 cm, see testing at [facility], he will see endocrinology in December to follow-up on this. CT inpt [facility] Sept = Mild left pleural effusion with compressive atelectasis. Lungs are clear here today, no current cough, non-smoker. Watch for increased fever, cough. Inpatient echocardiogram was unremarkable, venous duplex showed no DVT. No pulmonary emboli seen on CT. EKG was within normal limits, does relate had some type of arrhythmia inpatient, was observed, regular rhythm here today. See visit with me in August, he does have a slip to do fasting lipids, PSA, testosterone along with microalbumin, I would like him to wait and do that in 2 to 3 weeks, he just started testosterone. Back in August A1c was 7.1, inpatient redo was 7.3, he is continuing to try to work on healthy diet, try to increase exercise as tolerated, stay on Jardiance 25 mg daily as is. He did see an eye doctor within the last 6 to 12 months, monitor feet for neuropathy. No issues with gout, stay on allopurinol 300 mg daily. We did review previous blood work,   He is up to date with Lipid screening. Continue atorvastatin 10 mg daily, await redo lipids. He does see sleep specialist, continues on CPAP with benefit. Continue to work on healthy diet, lost significant weight with inpatient stay, is gaining weight back, see previous regarding bariatric surgery.       Immunization History   Administered Date(s) Administered    COVID-19 MODERNA VACC 0.5 ML IM 01/23/2021, 02/20/2021, 10/18/2021    INFLUENZA 02/15/2016, 10/16/2021    Influenza Quadrivalent Preservative Free 3 years and older IM 09/26/2014    Influenza Quadrivalent, 6-35 Months IM 02/15/2016    Influenza, injectable, quadrivalent, preservative free 0.5 mL 10/23/2020    Influenza, seasonal, injectable 10/11/2013    Tdap 10/23/2020    Tuberculin Skin Test-PPD Intradermal 04/01/2011       He does do yearly Flu shot. ( At pharmacy)  Tdap/tetanus shot is up to date  (done every 10 yrs for superficial cuts, every 5 yrs for deep wounds)  Covid vaccine booster will be done at pharmacy      Was never a smoker     Regarding Colon Cancer screening, see prev visit- Fhx Cancer w Dad Will set up Colonoscopy screening. Discussed Prostate Cancer screening pros/cons starting at age 48. PSA blood test  ordered. He will be seeing urology in follow-up, history red blood cells in urine, history of stones, they plan to redo ultrasound renal/bladder    We will see him again in 6 months, sooner as needed.

## 2023-10-06 NOTE — PROGRESS NOTES
FAMILY PRACTICE OFFICE VISIT  Ramses Dougherty D.O. [address] [facility]  [address].  [place], [place], [address]      NAME: Iris Herrera  AGE: 47 y.o. SEX: male  : 1970   MRN: [id number]    DATE: 10/6/2023  TIME: 1:23 PM    Assessment and Plan     Problem List Items Addressed This Visit     Testicular hypogonadism    Obstructive sleep apnea treated with continuous positive airway pressure (CPAP)    Morbid obesity with BMI of 50.0-59.9, adult ([address])    Mixed hyperlipidemia    Gout    S/P laparoscopic sleeve gastrectomy     Type 2 diabetes mellitus without complication, without long-term current use of insulin (HCC)    Adrenal mass left ([address])   Other Visit Diagnoses     Encounter for annual physical exam    -  Primary    Atelectasis of left lung on inpt CT - s/p inpt admission w infection unknown etiology              Patient Instructions     He looks well here today, we did review his hospital stay at [facility]  through , onset some type of infectious process with trip to [place], viral testing including COVID test x3 was negative. Reviewed follow-up visit with Dr Donna Grider , incidental left adrenal mass 4.2 cm, see testing at [facility], he will see endocrinology in December to follow-up on this. CT inpt [facility] Sept = Mild left pleural effusion with compressive atelectasis. Lungs are clear here today, no current cough, non-smoker. Watch for increased fever, cough. Inpatient echocardiogram was unremarkable, venous duplex showed no DVT. No pulmonary emboli seen on CT. EKG was within normal limits, does relate had some type of arrhythmia inpatient, was observed, regular rhythm here today. See visit with me in August, he does have a slip to do fasting lipids, PSA, testosterone along with microalbumin, I would like him to wait and do that in 2 to 3 weeks, he just started testosterone.   Back in August A1c was 7.1, inpatient redo was 7.3, he is continuing to try to work on healthy diet, try to increase exercise as tolerated, stay on Jardiance 25 mg daily as is. He did see an eye doctor within the last 6 to 12 months, monitor feet for neuropathy. No issues with gout, stay on allopurinol 300 mg daily. We did review previous blood work,   He is up to date with Lipid screening. Continue atorvastatin 10 mg daily, await redo lipids. He does see sleep specialist, continues on CPAP with benefit. Continue to work on healthy diet, lost significant weight with inpatient stay, is gaining weight back, see previous regarding bariatric surgery. Immunization History   Administered Date(s) Administered   • COVID-19 MODERNA VACC 0.5 ML IM 01/23/2021, 02/20/2021, 10/18/2021   • INFLUENZA 02/15/2016, 10/16/2021   • Influenza Quadrivalent Preservative Free 3 years and older IM 09/26/2014   • Influenza Quadrivalent, 6-35 Months IM 02/15/2016   • Influenza, injectable, quadrivalent, preservative free 0.5 mL 10/23/2020   • Influenza, seasonal, injectable 10/11/2013   • Tdap 10/23/2020   • Tuberculin Skin Test-PPD Intradermal 04/01/2011       He does do yearly Flu shot. ( At pharmacy)  Tdap/tetanus shot is up to date  (done every 10 yrs for superficial cuts, every 5 yrs for deep wounds)  Covid vaccine booster will be done at pharmacy      Was never a smoker     Regarding Colon Cancer screening, see prev visit- Fhx Cancer w Dad Will set up Colonoscopy screening. Discussed Prostate Cancer screening pros/cons starting at age 48. PSA blood test  ordered. He will be seeing urology in follow-up, history red blood cells in urine, history of stones, they plan to redo ultrasound renal/bladder    We will see him again in 6 months, sooner as needed.                Chief Complaint     Chief Complaint   Patient presents with   • Follow-up     6m       History of Present Illness   Kym Hicks is a 47y.o.-year-old male who I had seen regarding knee pain August 25. He relates knee pain has improved, does have some ongoing ankle issues although that also has improved. He is in today for a physical.  He was hospitalized September 11 through 14 at [facility], had trip to [facility] with work, had developed cough, loose stools, malaise, fatigue, fever, chills along with shortness of breath, when he returned to the [facility] he was hospitalized. Home COVID test was negative, COVID testing inpatient was negative x2, other testing did not reveal source of infection, did receive some antibiotics is felt may be bacterial.  While flying out to [facility] he did note some flank pain, wondered if he might be developing a UTI, had no dysuria. Does have history of kidney stones, does see urology. He is much improved, no recent fevers, no current cough, no current shortness of breath, no chest pain. He is using Jardiance, trying to watch healthy diet    Has not yet set up colonoscopy screening, aware of need. Just started testosterone    Is using CPAP    Also incidentally had been found to have adrenal adenoma, see previous evaluation, testing. He will be seeing endocrinology in December. Review of Systems   Review of Systems   Constitutional: Positive for fatigue (Much improved versus hospital stay) and unexpected weight change (He did lose significant weight with hospital stay, is gaining weight back). Negative for appetite change and fever. HENT: Negative for sore throat and trouble swallowing. Respiratory: Negative for cough, chest tightness, shortness of breath and wheezing. Cardiovascular: Negative for chest pain, palpitations and leg swelling. Gastrointestinal: Negative for abdominal pain, blood in stool, nausea and vomiting. No acid reflux     No change in bowel   Genitourinary: Negative for dysuria and hematuria. Neurological: Negative for dizziness, syncope, light-headedness and headaches. Psychiatric/Behavioral: Negative for behavioral problems and confusion. Active Problem List     Patient Active Problem List   Diagnosis   • Testicular hypogonadism   • Obstructive sleep apnea treated with continuous positive airway pressure (CPAP)   • Nephrolithiasis   • Morbid obesity with BMI of 50.0-59.9, adult ([address])   • Mixed hyperlipidemia   • Gout   • Osteoarthritis of left knee   • Vitamin D deficiency   • S/P laparoscopic sleeve gastrectomy 2018   • History of hypertension-resolved with bariatric procedure   • Family history of malignant neoplasm of gastrointestinal tract   • Type 2 diabetes mellitus without complication, without long-term current use of insulin (HCC)   • Adrenal mass left [facility])       Past Medical History:  Reviewed    Past Surgical History:  Reviewed    Family History:  Reviewed    Social History:  Reviewed    Objective     Vitals:    10/06/23 0959   BP: 124/82   BP Location: Left arm   Patient Position: Sitting   Cuff Size: Large   Pulse: 65   Resp: 20   Temp: 97.5 °F (36.4 °C)   TempSrc: Tympanic   SpO2: 96%   Weight: (!) 182 kg (401 lb 3.2 oz)   Height: 5' 11" (1.803 m)     Body mass index is 55.96 kg/m². BP Readings from Last 3 Encounters:   10/06/23 124/82   09/29/23 124/88   08/25/23 120/90       Wt Readings from Last 3 Encounters:   10/06/23 (!) 182 kg (401 lb 3.2 oz)   09/29/23 (!) 180 kg (397 lb)   08/25/23 (!) 191 kg (420 lb)       Physical Exam  Constitutional:       General: He is not in acute distress. Appearance: Normal appearance. He is well-developed. He is obese. He is not ill-appearing. Eyes:      General: No scleral icterus. Neck:      Vascular: No carotid bruit. Cardiovascular:      Rate and Rhythm: Normal rate and regular rhythm. Heart sounds: Normal heart sounds. No murmur heard. Pulmonary:      Effort: Pulmonary effort is normal. No respiratory distress. Breath sounds: Normal breath sounds. No wheezing, rhonchi or rales.    Abdominal: Palpations: Abdomen is soft. Tenderness: There is no abdominal tenderness. Musculoskeletal:      Right lower leg: No edema. Left lower leg: No edema. Lymphadenopathy:      Cervical: No cervical adenopathy. Skin:     Coloration: Skin is not jaundiced. Neurological:      General: No focal deficit present. Mental Status: He is alert and oriented to person, place, and time. Psychiatric:         Mood and Affect: Mood normal.         Behavior: Behavior normal.         ALLERGIES:  Allergies   Allergen Reactions   • Augmentin  [Amoxicillin-Pot Clavulanate] Diarrhea   • Metformin Diarrhea     Severe diarrhea at low dose but tolerates XR dose       Current Medications     Current Outpatient Medications   Medication Sig Dispense Refill   • allopurinol (ZYLOPRIM) 300 mg tablet TAKE 1 TABLET BY MOUTH EVERY DAY 90 tablet 1   • atorvastatin (LIPITOR) 10 mg tablet Take 1 tablet (10 mg total) by mouth daily 90 tablet 3   • B Complex Vitamins (VITAMIN B COMPLEX PO) Take 1 tablet by mouth     • Calcium Carb-Cholecalciferol (CALCIUM CARBONATE-VITAMIN D3 PO) Take 1 tablet by mouth     • Cholecalciferol (Vitamin D) 50 MCG (2000 UT) tablet Take 1 tablet (2,000 Units total) by mouth daily 30 tablet 11   • Coenzyme Q10 (COQ10 PO) Take by mouth every other day Liquid     • Empagliflozin 25 MG TABS Take 1 tablet (25 mg total) by mouth daily 90 tablet 1   • Multiple Vitamin (MULTI-VITAMIN DAILY) TABS Take 1 tablet by mouth daily     • NON FORMULARY in the morning [place] supplement for pain     • Omega-3 Fatty Acids (fish oil) 1,000 mg Take 1,000 mg by mouth daily     • testosterone (AndroGel Pump) 1.62 % TD gel pump Use 2 pump presses daily 75 g 2   • Turmeric 1053 MG TABS Take by mouth     • vitamin B-12 (VITAMIN B-12) 1,000 mcg tablet Take 1,000 mcg by mouth daily       No current facility-administered medications for this visit.             No orders of the defined types were placed in this encounter.         Levern Leak, DO

## 2023-11-19 ENCOUNTER — APPOINTMENT (OUTPATIENT)
Dept: LAB | Facility: HOSPITAL | Age: 53
End: 2023-11-19
Payer: COMMERCIAL

## 2023-11-19 DIAGNOSIS — E27.8 ADRENAL MASS (HCC): ICD-10-CM

## 2023-11-19 DIAGNOSIS — R31.9 HEMATURIA, UNSPECIFIED TYPE: ICD-10-CM

## 2023-11-19 DIAGNOSIS — N20.0 NEPHROLITH: ICD-10-CM

## 2023-11-19 DIAGNOSIS — E29.1 TESTICULAR HYPOGONADISM: ICD-10-CM

## 2023-11-19 LAB — PSA SERPL-MCNC: 0.98 NG/ML (ref 0–4)

## 2023-11-19 PROCEDURE — 84402 ASSAY OF FREE TESTOSTERONE: CPT

## 2023-11-19 PROCEDURE — 84403 ASSAY OF TOTAL TESTOSTERONE: CPT

## 2023-11-20 LAB
TESTOST FREE SERPL-MCNC: 4.3 PG/ML (ref 7.2–24)
TESTOST SERPL-MCNC: 129 NG/DL (ref 264–916)

## 2023-11-22 ENCOUNTER — VBI (OUTPATIENT)
Dept: ADMINISTRATIVE | Facility: OTHER | Age: 53
End: 2023-11-22

## 2023-12-01 ENCOUNTER — CONSULT (OUTPATIENT)
Dept: ENDOCRINOLOGY | Facility: CLINIC | Age: 53
End: 2023-12-01
Payer: COMMERCIAL

## 2023-12-01 VITALS
WEIGHT: 315 LBS | BODY MASS INDEX: 44.1 KG/M2 | SYSTOLIC BLOOD PRESSURE: 122 MMHG | DIASTOLIC BLOOD PRESSURE: 82 MMHG | OXYGEN SATURATION: 96 % | HEART RATE: 76 BPM | HEIGHT: 71 IN

## 2023-12-01 DIAGNOSIS — E27.8 ADRENAL MASS (HCC): Primary | ICD-10-CM

## 2023-12-01 DIAGNOSIS — E29.1 TESTICULAR HYPOGONADISM: ICD-10-CM

## 2023-12-01 PROCEDURE — 99244 OFF/OP CNSLTJ NEW/EST MOD 40: CPT | Performed by: STUDENT IN AN ORGANIZED HEALTH CARE EDUCATION/TRAINING PROGRAM

## 2023-12-01 RX ORDER — DEXAMETHASONE 1 MG
1 TABLET ORAL ONCE
Qty: 1 TABLET | Refills: 0 | Status: SHIPPED | OUTPATIENT
Start: 2023-12-01 | End: 2023-12-01

## 2023-12-01 NOTE — PROGRESS NOTES
Reema Garcias  47 y.o. Male MRN: [id number]  Encounter: [id number]    New Patient Consult Note    CC: Adrenal nodule, hypogonadism    Referring Provider  Juan C Ramirez, [address] [facility]  [id number] [place] [id number]      ASSESSMENT AND PLAN  Assessment  Stalin HendersonRaul Lion is a 64-year-old male with an indeterminate adrenal incidentaloma and hypogonadism. Plan:   Adrenal incidentaloma  - Previously obtained metanephrines and ARR were not suggestive of hyperfunction  - He will undergo a dexamethasone suppression test now to complete the adrenal hyperfunction workup; instructions were discussed with and provided to him for review  - In 4 weeks, he will obtain a DHEA, serum ACTH, and serum AM cortisol between 7-9 AM  - He will also undergo a CT adrenal protocol to further evaluate the adenoma given its size   - Refer to surgical oncology to discuss potential surgical interventions     Hypogonadism  - Recommend holding AndroGel for at least 4 weeks and then repeating lab work for total and free testosterone in addition to an [address], [address] [place] [address], and serum estradiol  - After this lab work is reviewed, will contact him about recommendations for restarting the AndroGel      HISTORY OF PRESENT ILLNESS  HPI:    Stalin HendersonRaul Lion is a 64-year-old male with a past medical history significant for hypogonadism, non-insulin-requiring type 2 diabetes mellitus, hypertension, hyperlipidemia, WALDO on CPAP, and obesity s/p sleeve gastrectomy (2018) who presents for initial evaluation of his adrenal incidentaloma and hypogonadism. Adrenal Incidentaloma  He was hospitalized at [facility] from September 11 to 14 and underwent a CTA chest on 9/11/2023 which revealed an indeterminate left adrenal mass measuring up to 4.2 cm.   Lab work from 9/14/2023 reveals a aldosterone renin ratio of 6.1 with a aldosterone of 3.7 and renal and activity of 0.6, serum cortisol of 13.6 obtained at 9:36 AM, fractionated metanephrines at 0.13, and free normetanephrines of 0.2. He endorses depression, one episode of NSVT in the [facility] ED, occasional numbness and tingling of his feet, acne on his back during his teenage years, decreased muscle mass at shoulder girdle, and hair loss around age 46. He has never had previous imaging demonstrating an adrenal nodule. He denies long-term use of glucocorticoids. His family history is significant for hypertension particularly on his father's side of the family. Hypogonadism  He was first diagnosed with hypogonadism 2 years ago. He has had lab work as far back as 2016 demonstrating low testosterone levels. He was initiated on Androgel at least 4 years ago and is currently 1.62% 2 pumps daily on his abdomen. He does miss a dose twice a week or if his pharmacy does not refill it in time. He had puberty at age 10-15. He has never fathered children and his sperm evaluation revealed abnormal sperm morphology. He shaves daily. He had noticed gynecomastia without tenderness, galactorrhea, or masses since puberty. He endorses fatigue, decreased muscle mass particularly in his shoulder girdle, and loss of leg hair during his late teens. He had one TBI/concussion in his late teens/early 20's while playing football. He denies any history of testicular trauma, radiation therapy, chemotherapy, long-term narcotic or steroid use, or marijuana use. He has a history of a sleeve gastrectomy with net loss of 60 lbs since 2018. He has been prediabetic/diabetic since 2013. He denies tobacco use and illicit drug use but endorses social alcohol use up to 1 drink a week. He works as a supervisor for [employer]. Review of Systems   Constitutional:  Positive for fatigue. Negative for appetite change, chills, fever and unexpected weight change. HENT:  Negative for ear pain, sore throat, trouble swallowing and voice change. Eyes:  Negative for pain and visual disturbance.    Respiratory: Negative for cough and shortness of breath. Cardiovascular:  Negative for chest pain and palpitations. Gastrointestinal:  Negative for abdominal pain, constipation, diarrhea, nausea and vomiting. Endocrine: Negative for cold intolerance and heat intolerance. Genitourinary:  Negative for dysuria and hematuria. Musculoskeletal:  Negative for arthralgias and back pain. Skin:  Negative for color change and rash. Neurological:  Positive for numbness (of feet). Negative for seizures and syncope. All other systems reviewed and are negative. Past Medical History:   Diagnosis Date    Essential hypertension 06/14/2017    borderline - not on medication at this time    Gout     Hyperglycemia ( hx diabetes pre-bariatric surgery) 2/16/2016    Mild anemia 9/29/2023    WALDO on CPAP        Past Surgical History:   Procedure Laterality Date    BARIATRIC SURGERY  2018    sleeve procedure    COLONOSCOPY      Had neg 2011(redo at age 40)    KIDNEY SURGERY Right     For kidney stones age 7    KNEE ARTHROSCOPY Left         Family History   Problem Relation Age of Onset    Colon cancer Father     Diabetes Brother         Social History     Substance and Sexual Activity   Alcohol Use Yes    Alcohol/week: 1.0 standard drink of alcohol    Types: 1 Shots of liquor per week    Comment: 1x month     Social History     Tobacco Use   Smoking Status Never   Smokeless Tobacco Never         OBJECTIVE  Visit Vitals  /82 (BP Location: Left arm, Patient Position: Sitting, Cuff Size: Adult)   Pulse 76   Ht 5' 10.5" (1.791 m)   Wt (!) 187 kg (412 lb 3.2 oz)   SpO2 96%   BMI 58.31 kg/m²   Smoking Status Never   BSA 2.85 m²       Physical Exam  Vitals and nursing note reviewed. Constitutional:       General: He is not in acute distress. Appearance: He is well-developed. He is obese. HENT:      Head: Normocephalic and atraumatic.    Eyes:      Conjunctiva/sclera: Conjunctivae normal.   Cardiovascular:      Rate and Rhythm: Normal rate and regular rhythm. Heart sounds: No murmur heard. Pulmonary:      Effort: Pulmonary effort is normal. No respiratory distress. Breath sounds: Normal breath sounds. Chest:      Comments: Bilateral gynecomastia without tenderness, active discharge, or masses  Abdominal:      Palpations: Abdomen is soft. Tenderness: There is no abdominal tenderness. Musculoskeletal:         General: No swelling. Cervical back: Neck supple. Skin:     General: Skin is warm and dry. Capillary Refill: Capillary refill takes less than 2 seconds. Comments: Multiple faded striae on abdomen and chest   Neurological:      Mental Status: He is alert. Psychiatric:         Mood and Affect: Mood normal.       Labs   Latest Reference Range & Units 11/19/23 10:09   Testosterone, Total, LC/ - 916 ng/dL 129 (L)   TESTOSTERONE FREE 7.2 - 24.0 pg/mL 4.3 (L)   (L): Data is abnormally low      Imaging  Procedure Note  Jenny Garcia MD - 09/12/2023  Formatting of this note might be different from the original.  HISTORY: sob, tachycardia, elevated d dimer. .    MRN:  [id number]        DATE OF SERVICE:  9/11/2023 11:30 PM                          EXAM DESCRIPTION:  CT CHEST PULMONARY EMBOLISM W CONTRAST      COMPARISON:  No relevant recent priors. TECHNIQUE: CT of the chest performed with intravenous contrast with attention to  the pulmonary arteries. FINDINGS:    Cardiovascular/Pulmonary Arteries: No evidence of saddle embolism. Limited  evaluation of the distal right and left main pulmonary arteries, as well as  segmental and subsegmental branches due to suboptimal opacification. The thoracic aorta is otherwise unremarkable in caliber. No evidence of  cardiomegaly. Lungs/pleura: Moderate left pleural effusion with compressive atelectasis. Mediastinum/Lymph nodes: The visualized thyroid fossa is grossly unremarkable. No evidence of pathologically enlarged lymph nodes.     Chest wall/Soft tissues: Unremarkable. Bones: No focal suspicious osseous abnormality. Upper abdomen: The visualized portions of the upper abdomen show 4.2 cm left  adrenal mass. IMPRESSION:  IMPRESSION:  1. No CT evidence of saddle embolism. Limited evaluation of the peripheral  branches secondary to suboptimal opacification and small peripheral emboli  cannot be excluded. 2. Mild left pleural effusion with compressive atelectasis. 3. Indeterminant left adrenal mass measuring up to 4.2 cm. CT examination performed with dose lowering protocol in accordance with JACI. [id number]  Exam End: 09/11/23 11:48 PM    Specimen Collected: 09/12/23 12:49 AM Last Resulted: 09/12/23  1:12 AM   Received From: [facility]  Result Received: 11/19/23  9:59 AM        Discussed with the patient and all questioned fully answered. He will call me if any problems arise. Counseled patient on diagnostic results, prognosis, risk and benefit of treatment options, instruction for management, importance of treatment compliance, risk factor reduction, and impressions.

## 2023-12-01 NOTE — PATIENT INSTRUCTIONS
- You have been ordered a dexamethasone suppression test. For this, you need to  the prescription for dexamethasone 1 mg 1 tablet which has been sent to your pharmacy. You will take the dexamethasone at 11 pm and then have the cortisol test done between 7 to 9 am the next day.  Any later, the dexamethasone will wear off and you may have to repeat the test.   - Have the CT scan done  - Stop your AndroGel for 4 weeks and have the remaining lab work performed between the hours of 7 to 9 am   - We will call you with the results of the above tests and discuss next steps  - Please see the surgeon who specializes in removal of these nodules

## 2023-12-04 ENCOUNTER — TELEPHONE (OUTPATIENT)
Dept: HEMATOLOGY ONCOLOGY | Facility: CLINIC | Age: 53
End: 2023-12-04

## 2023-12-04 NOTE — TELEPHONE ENCOUNTER
I called [facility] in response to a referral that was received for patient to establish care with Surgical Oncology. Outreach was made to schedule a consultation. Patient declined scheduling. The referral has been closed.

## 2024-01-15 DIAGNOSIS — E78.2 MIXED HYPERLIPIDEMIA: ICD-10-CM

## 2024-01-15 RX ORDER — ATORVASTATIN CALCIUM 10 MG/1
10 TABLET, FILM COATED ORAL DAILY
Qty: 90 TABLET | Refills: 1 | Status: SHIPPED | OUTPATIENT
Start: 2024-01-15

## 2024-01-15 NOTE — TELEPHONE ENCOUNTER
Reason for call:   [x] Refill   [] Prior Auth  [] Other:     Office:   [x] PCP/Provider - Dr Dougherty  [] Specialty/Provider -     Medication:   Atorvastatin 10 mg, 1 qd, 90      Pharmacy:   [facility]     Does the patient have enough for 3 days?   [x] Yes   [] No - Send as HP to POD

## 2024-04-23 DIAGNOSIS — E29.1 TESTICULAR HYPOGONADISM: ICD-10-CM

## 2024-04-23 DIAGNOSIS — M1A.00X0 IDIOPATHIC CHRONIC GOUT WITHOUT TOPHUS, UNSPECIFIED SITE: ICD-10-CM

## 2024-04-24 RX ORDER — ALLOPURINOL 300 MG/1
TABLET ORAL
Qty: 90 TABLET | Refills: 1 | Status: SHIPPED | OUTPATIENT
Start: 2024-04-24

## 2024-04-24 RX ORDER — TESTOSTERONE 20.25 MG/1.25G
GEL TOPICAL
Qty: 75 G | Refills: 0 | Status: SHIPPED | OUTPATIENT
Start: 2024-04-24

## 2024-04-24 NOTE — TELEPHONE ENCOUNTER
Please call him, I did refill his testosterone, he did cancel his last visit with me earlier this month, he also had canceled his appointment with endocrinology, please reschedule

## 2024-05-02 ENCOUNTER — VBI (OUTPATIENT)
Dept: ADMINISTRATIVE | Facility: OTHER | Age: 54
End: 2024-05-02

## 2024-08-05 ENCOUNTER — OFFICE VISIT (OUTPATIENT)
Dept: FAMILY MEDICINE CLINIC | Facility: CLINIC | Age: 54
End: 2024-08-05
Payer: COMMERCIAL

## 2024-08-05 VITALS
RESPIRATION RATE: 18 BRPM | WEIGHT: 315 LBS | HEIGHT: 71 IN | BODY MASS INDEX: 44.1 KG/M2 | DIASTOLIC BLOOD PRESSURE: 68 MMHG | HEART RATE: 70 BPM | SYSTOLIC BLOOD PRESSURE: 124 MMHG | OXYGEN SATURATION: 95 % | TEMPERATURE: 97.8 F

## 2024-08-05 DIAGNOSIS — E27.8 ADRENAL MASS GREATER THAN 4 CM IN DIAMETER (HCC): ICD-10-CM

## 2024-08-05 DIAGNOSIS — E29.1 TESTICULAR HYPOGONADISM: ICD-10-CM

## 2024-08-05 DIAGNOSIS — M1A.0720 IDIOPATHIC CHRONIC GOUT OF LEFT FOOT WITHOUT TOPHUS: ICD-10-CM

## 2024-08-05 DIAGNOSIS — E11.9 TYPE 2 DIABETES MELLITUS WITHOUT COMPLICATION, WITHOUT LONG-TERM CURRENT USE OF INSULIN (HCC): Primary | ICD-10-CM

## 2024-08-05 DIAGNOSIS — G47.33 OBSTRUCTIVE SLEEP APNEA TREATED WITH CONTINUOUS POSITIVE AIRWAY PRESSURE (CPAP): ICD-10-CM

## 2024-08-05 DIAGNOSIS — E78.2 MIXED HYPERLIPIDEMIA: ICD-10-CM

## 2024-08-05 DIAGNOSIS — N20.0 NEPHROLITHIASIS: ICD-10-CM

## 2024-08-05 DIAGNOSIS — Z98.84 S/P LAPAROSCOPIC SLEEVE GASTRECTOMY: ICD-10-CM

## 2024-08-05 DIAGNOSIS — Z86.79 HISTORY OF HYPERTENSION: ICD-10-CM

## 2024-08-05 DIAGNOSIS — E66.01 MORBID OBESITY WITH BMI OF 50.0-59.9, ADULT (HCC): ICD-10-CM

## 2024-08-05 DIAGNOSIS — Z80.0 FAMILY HISTORY OF MALIGNANT NEOPLASM OF GASTROINTESTINAL TRACT: ICD-10-CM

## 2024-08-05 LAB
LEFT EYE DIABETIC RETINOPATHY: ABNORMAL
LEFT EYE IMAGE QUALITY: ABNORMAL
LEFT EYE MACULAR EDEMA: ABNORMAL
LEFT EYE OTHER RETINOPATHY: ABNORMAL
RIGHT EYE DIABETIC RETINOPATHY: ABNORMAL
RIGHT EYE IMAGE QUALITY: ABNORMAL
RIGHT EYE MACULAR EDEMA: ABNORMAL
RIGHT EYE OTHER RETINOPATHY: ABNORMAL
SEVERITY (EYE EXAM): ABNORMAL
SL AMB POCT HEMOGLOBIN AIC: 7.6 (ref ?–6.5)

## 2024-08-05 PROCEDURE — 83036 HEMOGLOBIN GLYCOSYLATED A1C: CPT | Performed by: FAMILY MEDICINE

## 2024-08-05 PROCEDURE — 99214 OFFICE O/P EST MOD 30 MIN: CPT | Performed by: FAMILY MEDICINE

## 2024-08-05 RX ORDER — COLCHICINE 0.6 MG/1
0.6 TABLET ORAL 2 TIMES DAILY PRN
Qty: 20 TABLET | Refills: 0 | Status: SHIPPED | OUTPATIENT
Start: 2024-08-05

## 2024-08-05 RX ORDER — ATORVASTATIN CALCIUM 10 MG/1
10 TABLET, FILM COATED ORAL DAILY
Qty: 90 TABLET | Refills: 3 | Status: SHIPPED | OUTPATIENT
Start: 2024-08-05

## 2024-08-05 RX ORDER — SEMAGLUTIDE 0.25 MG/.5ML
INJECTION, SOLUTION SUBCUTANEOUS
Qty: 2 ML | Refills: 0 | Status: SHIPPED | OUTPATIENT
Start: 2024-08-05

## 2024-08-05 RX ORDER — TESTOSTERONE 20.25 MG/1.25G
GEL TOPICAL
Qty: 75 G | Refills: 1 | Status: SHIPPED | OUTPATIENT
Start: 2024-08-05

## 2024-08-05 NOTE — ASSESSMENT & PLAN NOTE
A1c today has climbed to 7.6, he will start Wegovy.  We discussed pros and cons with medicine, can cause significant nausea, bloating, if we start low and build up it usually is very well-tolerated.  Prescription was sent to pharmacy, he will update us in 3 weeks regarding how he is doing, we will plan to increase dose at that time, recheck with me in 8 weeks.    He will do updated blood work along with microalbumin, foot exam was done today, IRIS eye exam was done today, he also should see a regular ophthalmologist for full eye exam.

## 2024-08-05 NOTE — ASSESSMENT & PLAN NOTE
Blood pressure remains okay, 124/68, continue to monitor.  Echocardiogram was okay in September 2023.

## 2024-08-05 NOTE — ASSESSMENT & PLAN NOTE
He did see endocrinology regarding left adrenal mass, plans to follow-up with them 2 years after last evaluation

## 2024-08-05 NOTE — ASSESSMENT & PLAN NOTE
Unfortunately has gained weight again, he is 430 pounds today, his weight prior to sleeve gastrectomy in December 2018 was 436 pounds.    His A1c is also up he will try Wegovy, with sleeve history we do need to watch for increased side effects, call if notes significant side effects, update us in 3 weeks-reevaluate at follow-up in 8 weeks.

## 2024-08-05 NOTE — PROGRESS NOTES
FAMILY PRACTICE OFFICE VISIT  Moreno Dougherty D.O.    [facility]  [facility]  [address]  [address]  [place], [address]      NAME: Sammy Goldberg  AGE: 54 y.o. SEX: male  : 1970   MRN: [id number]    DATE: 2024  TIME: 5:25 PM      Assessment and Plan         Patient Instructions   1. Type 2 diabetes mellitus without complication, without long-term current use of insulin (HCC)  Assessment & Plan:  A1c today has climbed to 7.6, he will start Wegovy.  We discussed pros and cons with medicine, can cause significant nausea, bloating, if we start low and build up it usually is very well-tolerated.  Prescription was sent to pharmacy, he will update us in 3 weeks regarding how he is doing, we will plan to increase dose at that time, recheck with me in 8 weeks.    He will do updated blood work along with microalbumin, foot exam was done today, IRIS eye exam was done today, he also should see a regular ophthalmologist for full eye exam.    Orders:  -     POCT hemoglobin A1c  -     IRIS Diabetic eye exam  -     Semaglutide-Weight Management (Wegovy) 0.25 MG/0.5ML; Inject 0.25 mg under the skin weekly  -     Ambulatory Referral to Ophthalmology; Future  -     Lipid panel  -     Comprehensive metabolic panel  -     Albumin / creatinine urine ratio  2. Mixed hyperlipidemia  Assessment & Plan:  He will redo lipid panel, continue atorvastatin 10 mg daily  Orders:  -     atorvastatin (LIPITOR) 10 mg tablet; Take 1 tablet (10 mg total) by mouth daily  3. Testicular hypogonadism  Assessment & Plan:  He has for refill on testosterone, refill was sent in.  PSA was okay last fall, had seen urology regarding kidney stones.  Plan PSA later in the year.  Await other blood work  Orders:  -     Testosterone 1.62 % GEL; USE 2 PUMP PRESSES DAILY  4. 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  Last episode was about 4 years ago.    He has resumed allopurinol for few weeks, continue with that daily.  Include uric acid with upcoming blood work.  Can use colchicine 0.6 mg twice daily as needed for acute pain, we did discuss this can cause loose stools.  Orders:  -     colchicine (COLCRYS) 0.6 mg tablet; Take 1 tablet (0.6 mg total) by mouth 2 (two) times a day as needed (gout pain)  -     Uric acid  5. S/P laparoscopic sleeve gastrectomy 2018  6. Morbid obesity with BMI of 50.0-59.9, adult ([facility])  Assessment & Plan:  Unfortunately has gained weight again, he is 430 pounds today, his weight prior to sleeve gastrectomy in December 2018 was 436 pounds.    His A1c is also up he will start Wegovy, reevaluate at follow-up  Orders:  -     Semaglutide-Weight Management (Wegovy) 0.25 MG/0.5ML; Inject 0.25 mg under the skin weekly  7. Obstructive sleep apnea treated with continuous positive airway pressure (CPAP)  Assessment & Plan:  Continue on CPAP, he does find that to be beneficial, should have follow-up with sleep specialist  Orders:  -     Semaglutide-Weight Management (Wegovy) 0.25 MG/0.5ML; Inject 0.25 mg under the skin weekly  8. Adrenal mass greater than 4 cm in diameter ([facility])  Assessment & Plan:  He did see endocrinology regarding left adrenal mass, plans to follow-up with them 2 years after last evaluation  9. Family history of malignant neoplasm of gastrointestinal tract  Assessment & Plan:  He does have colonoscopy screening phone call today, he plans to do full colonoscopy soon  10. History of hypertension-resolved with bariatric procedure  Assessment & Plan:  Blood pressure remains okay, 124/68, continue to monitor.  Echocardiogram was okay in September 2023.  11. Nephrolithiasis  Assessment & Plan:  He did have a follow-up ultrasound in March, no obstruction, does have left-sided 9 mm stone.        Chief Complaint     Chief Complaint   Patient presents with     Follow-up     3 weeks ago started with gout flare.        History of Present Illness   Sammy Goldberg is a 54 y.o.-year-old male who made an acute appointment regarding pain in his left foot, he feels he has gout, had run out of his allopurinol for 3 weeks while traveling/staying with his sister.  I had last seen him last year.  He relates overall he has been feeling okay otherwise, unfortunately has gained back all of the weight he previously lost with bariatric procedure      Review of Systems   Review of Systems   Constitutional:  Negative for chills and fever.   Respiratory:  Negative for cough, shortness of breath and wheezing.    Cardiovascular:  Negative for chest pain, palpitations and leg swelling.   Gastrointestinal:  Negative for abdominal pain, blood in stool, nausea and vomiting.        He relates he will be undergoing colonoscopy, has screening call today, is far overdue   Genitourinary:  Negative for dysuria, flank pain and hematuria.   Neurological:  Negative for dizziness, seizures, syncope and light-headedness.   Hematological:  Does not bruise/bleed easily.   Psychiatric/Behavioral:  Positive for sleep disturbance (CPAP does help). Negative for behavioral problems.        Active Problem List     Patient Active Problem List   Diagnosis    Testicular hypogonadism    Obstructive sleep apnea treated with continuous positive airway pressure (CPAP)    Nephrolithiasis    Morbid obesity with BMI of 50.0-59.9, adult (HCC)    Mixed hyperlipidemia    Gout    Osteoarthritis of left knee    Vitamin D deficiency    S/P laparoscopic sleeve gastrectomy 2018    History of hypertension-resolved with bariatric procedure    Family history of malignant neoplasm of gastrointestinal tract    Type 2 diabetes mellitus without complication, without long-term current use of insulin (HCC)    Adrenal mass greater than 4 cm in diameter (HCC)       Past Medical History:  Reviewed    Past Surgical History:  Reviewed    Family  "History:  Reviewed    Social History:  Reviewed    Objective     Vitals:    08/05/24 1208   BP: 124/68   BP Location: Left arm   Patient Position: Sitting   Cuff Size: Extra-Large   Pulse: 70   Resp: 18   Temp: 97.8 °F (36.6 °C)   TempSrc: Temporal   SpO2: 95%   Weight: (!) 195 kg (430 lb)   Height: 5' 10.5\" (1.791 m)     Body mass index is 60.83 kg/m².    BP Readings from Last 3 Encounters:   08/05/24 124/68   12/01/23 122/82   10/06/23 124/82       Wt Readings from Last 3 Encounters:   08/05/24 (!) 195 kg (430 lb)   12/01/23 (!) 187 kg (412 lb 3.2 oz)   10/06/23 (!) 182 kg (401 lb 3.2 oz)       Physical Exam  Constitutional:       General: He is not in acute distress.     Appearance: Normal appearance. He is well-developed. He is obese. He is not ill-appearing.   Eyes:      General: No scleral icterus.  Cardiovascular:      Rate and Rhythm: Normal rate and regular rhythm.      Pulses: no weak pulses.           Dorsalis pedis pulses are 2+ on the right side and 2+ on the left side.        Posterior tibial pulses are 2+ on the right side and 2+ on the left side.      Heart sounds: Normal heart sounds. No murmur heard.  Pulmonary:      Effort: Pulmonary effort is normal. No respiratory distress.      Breath sounds: Normal breath sounds. No wheezing, rhonchi or rales.   Abdominal:      Tenderness: There is no abdominal tenderness.   Musculoskeletal:      Comments: No significant pitting edema at ankles, does have some tenderness forefoot on left   Feet:      Right foot:      Skin integrity: No ulcer, skin breakdown, erythema, warmth, callus or dry skin.      Left foot:      Skin integrity: No ulcer, skin breakdown, erythema, warmth, callus or dry skin.   Skin:     Coloration: Skin is not jaundiced.   Neurological:      Mental Status: He is alert. Mental status is at baseline.   Psychiatric:         Mood and Affect: Mood normal.         Behavior: Behavior normal.         ALLERGIES:  Allergies   Allergen Reactions    " Augmentin  [Amoxicillin-Pot Clavulanate] Diarrhea    Jardiance [Empagliflozin] Other (See Comments)     Urinary urgency w incontinence    Metformin Diarrhea     Severe diarrhea at low dose but tolerates XR dose       Current Medications     Current Outpatient Medications   Medication Sig Dispense Refill    allopurinol (ZYLOPRIM) 300 mg tablet TAKE 1 TABLET BY MOUTH EVERY DAY 90 tablet 1    atorvastatin (LIPITOR) 10 mg tablet Take 1 tablet (10 mg total) by mouth daily 90 tablet 3    B Complex Vitamins (VITAMIN B COMPLEX PO) Take 1 tablet by mouth      Calcium Carb-Cholecalciferol (CALCIUM CARBONATE-VITAMIN D3 PO) Take 1 tablet by mouth      Cholecalciferol (Vitamin D) 50 MCG (2000 UT) tablet Take 1 tablet (2,000 Units total) by mouth daily 30 tablet 11    Coenzyme Q10 (COQ10 PO) Take by mouth every other day Liquid      colchicine (COLCRYS) 0.6 mg tablet Take 1 tablet (0.6 mg total) by mouth 2 (two) times a day as needed (gout pain) 20 tablet 0    Multiple Vitamin (MULTI-VITAMIN DAILY) TABS Take 1 tablet by mouth daily      NON FORMULARY in the morning Turkey Tail supplement for pain      Omega-3 Fatty Acids (fish oil) 1,000 mg Take 1,000 mg by mouth daily      Semaglutide-Weight Management (Wegovy) 0.25 MG/0.5ML Inject 0.25 mg under the skin weekly 2 mL 0    Testosterone 1.62 % GEL USE 2 PUMP PRESSES DAILY 75 g 1    Turmeric 1053 MG TABS Take by mouth      vitamin B-12 (VITAMIN B-12) 1,000 mcg tablet Take 1,000 mcg by mouth daily       No current facility-administered medications for this visit.            Orders Placed This Encounter   Procedures    IRIS Diabetic eye exam    Lipid panel    Comprehensive metabolic panel    Albumin / creatinine urine ratio    Uric acid    Ambulatory Referral to Ophthalmology    POCT hemoglobin A1c         Moreno Dougherty DO      Diabetic Foot Exam    Patient's shoes and socks removed.    Right Foot/Ankle   Right Foot Inspection  Skin Exam: skin normal and skin intact. No dry skin, no  warmth, no callus, no erythema, no maceration, no abnormal color, no pre-ulcer, no ulcer and no callus.     Toe Exam: ROM and strength within normal limits.     Sensory   Monofilament testing: intact    Vascular  Capillary refills: < 3 seconds  The right DP pulse is 2+. The right PT pulse is 2+.     Left Foot/Ankle  Left Foot Inspection  Skin Exam: skin normal and skin intact. No dry skin, no warmth, no erythema, no maceration, normal color, no pre-ulcer, no ulcer and no callus.     Toe Exam: ROM and strength within normal limits.     Sensory   Monofilament testing: intact    Vascular  Capillary refills: < 3 seconds  The left DP pulse is 2+. The left PT pulse is 2+.     Assign Risk Category  No deformity present  No loss of protective sensation  No weak pulses  Risk: 0

## 2024-08-05 NOTE — ASSESSMENT & PLAN NOTE
Acute flare of gout with pain left foot, had missed allopurinol for about 3 weeks while vacationing.  Last episode was about 4 years ago.    He has resumed allopurinol for few weeks, continue with that daily.  Include uric acid with upcoming blood work.  Can use colchicine 0.6 mg twice daily as needed for acute pain, we did discuss this can cause loose stools.

## 2024-08-05 NOTE — PATIENT INSTRUCTIONS
1. Type 2 diabetes mellitus without complication, without long-term current use of insulin ([facility])  Assessment & Plan:  A1c today has climbed to 7.6, he will start Wegovy.  We discussed pros and cons with medicine, can cause significant nausea, bloating, if we start low and build up it usually is very well-tolerated.  Prescription was sent to pharmacy, he will update us in 3 weeks regarding how he is doing, we will plan to increase dose at that time, recheck with me in 8 weeks.    He will do updated blood work along with microalbumin, foot exam was done today, IRIS eye exam was done today, he also should see a regular ophthalmologist for full eye exam.    Orders:  -     POCT hemoglobin A1c  -     IRIS Diabetic eye exam  -     Semaglutide-Weight Management (Wegovy) 0.25 MG/0.5ML; Inject 0.25 mg under the skin weekly  -     Ambulatory Referral to Ophthalmology; Future  -     Lipid panel  -     Comprehensive metabolic panel  -     Albumin / creatinine urine ratio  2. Mixed hyperlipidemia  Assessment & Plan:  He will redo lipid panel, continue atorvastatin 10 mg daily  Orders:  -     atorvastatin (LIPITOR) 10 mg tablet; Take 1 tablet (10 mg total) by mouth daily  3. Testicular hypogonadism  Assessment & Plan:  He has for refill on testosterone, refill was sent in.  PSA was okay last fall, had seen urology regarding kidney stones.  Plan PSA later in the year.  Await other blood work  Orders:  -     Testosterone 1.62 % GEL; USE 2 PUMP PRESSES DAILY  4. Idiopathic chronic gout of left foot without tophus  Assessment & Plan:  Acute flare of gout with pain left foot, had missed allopurinol for about 3 weeks while vacationing.  Last episode was about 4 years ago.    He has resumed allopurinol for few weeks, continue with that daily.  Include uric acid with upcoming blood work.  Can use colchicine 0.6 mg twice daily as needed for acute pain, we did discuss this can cause loose stools.  Orders:  -     colchicine (COLCRYS) 0.6 mg  tablet; Take 1 tablet (0.6 mg total) by mouth 2 (two) times a day as needed (gout pain)  -     Uric acid  5. S/P laparoscopic sleeve gastrectomy 2018  6. Morbid obesity with BMI of 50.0-59.9, adult ([facility])  Assessment & Plan:  Unfortunately has gained weight again, he is 430 pounds today, his weight prior to sleeve gastrectomy in December 2018 was 436 pounds.    His A1c is also up he will start Wegovy, reevaluate at follow-up  Orders:  -     Semaglutide-Weight Management (Wegovy) 0.25 MG/0.5ML; Inject 0.25 mg under the skin weekly  7. Obstructive sleep apnea treated with continuous positive airway pressure (CPAP)  Assessment & Plan:  Continue on CPAP, he does find that to be beneficial, should have follow-up with sleep specialist  Orders:  -     Semaglutide-Weight Management (Wegovy) 0.25 MG/0.5ML; Inject 0.25 mg under the skin weekly  8. Adrenal mass greater than 4 cm in diameter ([facility])  Assessment & Plan:  He did see endocrinology regarding left adrenal mass, plans to follow-up with them 2 years after last evaluation  9. Family history of malignant neoplasm of gastrointestinal tract  Assessment & Plan:  He does have colonoscopy screening phone call today, he plans to do full colonoscopy soon  10. History of hypertension-resolved with bariatric procedure  Assessment & Plan:  Blood pressure remains okay, 124/68, continue to monitor.  Echocardiogram was okay in September 2023.  11. Nephrolithiasis  Assessment & Plan:  He did have a follow-up ultrasound in March, no obstruction, does have left-sided 9 mm stone.

## 2024-08-05 NOTE — ASSESSMENT & PLAN NOTE
He has for refill on testosterone, refill was sent in.  PSA was okay last fall, had seen urology regarding kidney stones.  Plan PSA later in the year.  Await other blood work

## 2024-08-16 LAB
LEFT EYE DIABETIC RETINOPATHY: NORMAL
RIGHT EYE DIABETIC RETINOPATHY: NORMAL

## 2024-08-24 DIAGNOSIS — M1A.0720 IDIOPATHIC CHRONIC GOUT OF LEFT FOOT WITHOUT TOPHUS: ICD-10-CM

## 2024-08-25 RX ORDER — COLCHICINE 0.6 MG/1
0.6 TABLET ORAL 2 TIMES DAILY PRN
Qty: 20 TABLET | Refills: 0 | Status: SHIPPED | OUTPATIENT
Start: 2024-08-25

## 2024-10-01 ENCOUNTER — RA CDI HCC (OUTPATIENT)
Dept: OTHER | Facility: HOSPITAL | Age: 54
End: 2024-10-01

## 2024-11-15 ENCOUNTER — OFFICE VISIT (OUTPATIENT)
Dept: FAMILY MEDICINE CLINIC | Facility: CLINIC | Age: 54
End: 2024-11-15
Payer: COMMERCIAL

## 2024-11-15 ENCOUNTER — APPOINTMENT (OUTPATIENT)
Dept: LAB | Facility: MEDICAL CENTER | Age: 54
End: 2024-11-15
Payer: COMMERCIAL

## 2024-11-15 VITALS
RESPIRATION RATE: 20 BRPM | DIASTOLIC BLOOD PRESSURE: 72 MMHG | WEIGHT: 315 LBS | HEIGHT: 71 IN | OXYGEN SATURATION: 99 % | BODY MASS INDEX: 44.1 KG/M2 | HEART RATE: 66 BPM | TEMPERATURE: 98.6 F | SYSTOLIC BLOOD PRESSURE: 128 MMHG

## 2024-11-15 DIAGNOSIS — Z86.79 HISTORY OF HYPERTENSION: ICD-10-CM

## 2024-11-15 DIAGNOSIS — E78.2 MIXED HYPERLIPIDEMIA: ICD-10-CM

## 2024-11-15 DIAGNOSIS — M1A.0720 IDIOPATHIC CHRONIC GOUT OF LEFT FOOT WITHOUT TOPHUS: ICD-10-CM

## 2024-11-15 DIAGNOSIS — Z00.00 ENCOUNTER FOR ANNUAL PHYSICAL EXAM: Primary | ICD-10-CM

## 2024-11-15 DIAGNOSIS — E11.9 TYPE 2 DIABETES MELLITUS WITHOUT COMPLICATION, WITHOUT LONG-TERM CURRENT USE OF INSULIN (HCC): ICD-10-CM

## 2024-11-15 DIAGNOSIS — G47.33 OBSTRUCTIVE SLEEP APNEA TREATED WITH CONTINUOUS POSITIVE AIRWAY PRESSURE (CPAP): ICD-10-CM

## 2024-11-15 DIAGNOSIS — E29.1 TESTICULAR HYPOGONADISM: ICD-10-CM

## 2024-11-15 DIAGNOSIS — Z12.5 SCREENING PSA (PROSTATE SPECIFIC ANTIGEN): ICD-10-CM

## 2024-11-15 DIAGNOSIS — Z98.84 S/P LAPAROSCOPIC SLEEVE GASTRECTOMY: ICD-10-CM

## 2024-11-15 DIAGNOSIS — E66.01 MORBID OBESITY WITH BMI OF 50.0-59.9, ADULT (HCC): ICD-10-CM

## 2024-11-15 LAB
ALBUMIN SERPL BCG-MCNC: 3.9 G/DL (ref 3.5–5)
ALP SERPL-CCNC: 94 U/L (ref 34–104)
ALT SERPL W P-5'-P-CCNC: 17 U/L (ref 7–52)
ANION GAP SERPL CALCULATED.3IONS-SCNC: 9 MMOL/L (ref 4–13)
AST SERPL W P-5'-P-CCNC: 24 U/L (ref 13–39)
BILIRUB SERPL-MCNC: 0.78 MG/DL (ref 0.2–1)
BUN SERPL-MCNC: 15 MG/DL (ref 5–25)
CALCIUM SERPL-MCNC: 8.9 MG/DL (ref 8.4–10.2)
CHLORIDE SERPL-SCNC: 108 MMOL/L (ref 96–108)
CHOLEST SERPL-MCNC: 225 MG/DL (ref ?–200)
CO2 SERPL-SCNC: 28 MMOL/L (ref 21–32)
CREAT SERPL-MCNC: 0.9 MG/DL (ref 0.6–1.3)
CREAT UR-MCNC: 161 MG/DL
ERYTHROCYTE [DISTWIDTH] IN BLOOD BY AUTOMATED COUNT: 13.7 % (ref 11.6–15.1)
GFR SERPL CREATININE-BSD FRML MDRD: 97 ML/MIN/1.73SQ M
GLUCOSE P FAST SERPL-MCNC: 134 MG/DL (ref 65–99)
HCT VFR BLD AUTO: 42 % (ref 36.5–49.3)
HDLC SERPL-MCNC: 43 MG/DL
HGB BLD-MCNC: 13.5 G/DL (ref 12–17)
LDLC SERPL CALC-MCNC: 133 MG/DL (ref 0–100)
MCH RBC QN AUTO: 28.7 PG (ref 26.8–34.3)
MCHC RBC AUTO-ENTMCNC: 32.1 G/DL (ref 31.4–37.4)
MCV RBC AUTO: 89 FL (ref 82–98)
MICROALBUMIN UR-MCNC: 42.2 MG/L
MICROALBUMIN/CREAT 24H UR: 26 MG/G CREATININE (ref 0–30)
NONHDLC SERPL-MCNC: 182 MG/DL
PLATELET # BLD AUTO: 237 THOUSANDS/UL (ref 149–390)
PMV BLD AUTO: 11.5 FL (ref 8.9–12.7)
POTASSIUM SERPL-SCNC: 4.5 MMOL/L (ref 3.5–5.3)
PROT SERPL-MCNC: 7.1 G/DL (ref 6.4–8.4)
PSA SERPL-MCNC: 1.38 NG/ML (ref 0–4)
RBC # BLD AUTO: 4.7 MILLION/UL (ref 3.88–5.62)
SL AMB POCT HEMOGLOBIN AIC: 7.7 (ref ?–6.5)
SODIUM SERPL-SCNC: 145 MMOL/L (ref 135–147)
TRIGL SERPL-MCNC: 246 MG/DL (ref ?–150)
URATE SERPL-MCNC: 6.4 MG/DL (ref 3.5–8.5)
WBC # BLD AUTO: 7.84 THOUSAND/UL (ref 4.31–10.16)

## 2024-11-15 PROCEDURE — 82043 UR ALBUMIN QUANTITATIVE: CPT | Performed by: FAMILY MEDICINE

## 2024-11-15 PROCEDURE — 83036 HEMOGLOBIN GLYCOSYLATED A1C: CPT | Performed by: FAMILY MEDICINE

## 2024-11-15 PROCEDURE — 82570 ASSAY OF URINE CREATININE: CPT | Performed by: FAMILY MEDICINE

## 2024-11-15 PROCEDURE — G0103 PSA SCREENING: HCPCS | Performed by: FAMILY MEDICINE

## 2024-11-15 PROCEDURE — 99396 PREV VISIT EST AGE 40-64: CPT | Performed by: FAMILY MEDICINE

## 2024-11-15 PROCEDURE — 36415 COLL VENOUS BLD VENIPUNCTURE: CPT | Performed by: FAMILY MEDICINE

## 2024-11-15 PROCEDURE — 85027 COMPLETE CBC AUTOMATED: CPT | Performed by: FAMILY MEDICINE

## 2024-11-15 RX ORDER — SEMAGLUTIDE 0.25 MG/.5ML
INJECTION, SOLUTION SUBCUTANEOUS
Qty: 2 ML | Refills: 0 | Status: SHIPPED | OUTPATIENT
Start: 2024-11-15

## 2024-11-15 RX ORDER — TESTOSTERONE 20.25 MG/1.25G
GEL TOPICAL
Qty: 75 G | Refills: 1 | Status: SHIPPED | OUTPATIENT
Start: 2024-11-15

## 2024-11-15 NOTE — ASSESSMENT & PLAN NOTE
History sleeve 2018, preop weight was 436 pounds, he has gained all of his weight back, weight here today is 437 pounds with BMI 61.  he will start Wegovy plans to follow-up with bariatric group/nutritionist.    Recheck here roughly 3 months, update me in 2 to 3 weeks after starting medication

## 2024-11-15 NOTE — PROGRESS NOTES
Name: Sammy Goldberg      : 1970      MRN: [id number]  Encounter Provider: Moreno Dougherty DO  Encounter Date: 11/15/2024   Encounter department: [facility]  :  Assessment & Plan  Encounter for annual physical exam         Type 2 diabetes mellitus without complication, without long-term current use of insulin ([facility])  A1c redone here today is the same as back in August at 7.7 -has not yet started Wegovy due to availability, I [facility] his [facility], if he is unable to pick that up he should let me know.  He should let me know how he is doing with this after 2 doses, we will plan to send in next level dose at that time, continue to titrate up as tolerated.    If not covered I would like him to check if any other medication such as Mounjaro are covered.  Lab Results   Component Value Date    HGBA1C 7.7 (A) 11/15/2024       Blood pressure today 128/72, continue to monitor, remains off medication.  Had echocardiogram in 2023 that looked okay.    Orders:    Albumin / creatinine urine ratio; Future    POCT hemoglobin A1c    Semaglutide-Weight Management (Wegovy) 0.25 MG/0.5ML; Inject 0.25 mg under the skin weekly    CBC    Morbid obesity with BMI of 50.0-59.9, adult ([facility])  History sleeve 2018, preop weight was 436 pounds, he has gained all of his weight back, weight here today is 437 pounds with BMI 61.  he will start Wegovy plans to follow-up with bariatric group/nutritionist.    Recheck here roughly 3 months, update me in 2 to 3 weeks after starting medication  Orders:    Semaglutide-Weight Management (Wegovy) 0.25 MG/0.5ML; Inject 0.25 mg under the skin weekly    Obstructive sleep apnea treated with continuous positive airway pressure (CPAP)  He is trying to use PAP unit routinely, needs a new strap.  Orders:    Semaglutide-Weight Management (Wegovy) 0.25 MG/0.5ML; Inject 0.25 mg under the skin weekly    Screening PSA (prostate specific antigen)  PSA was done  last November, await redo.  Watch closely as he is on testosterone  Orders:    PSA, Total Screen    Idiopathic chronic gout of left foot without tophus  Await uric acid level, continue with allopurinol.       Mixed hyperlipidemia  Await redo lipids, note he has only been on atorvastatin again for a week       History of hypertension-resolved with bariatric procedure  Blood pressure today 128/72, continue to monitor, remains off medication.  Had echocardiogram in September 2023 that looked okay.       S/P laparoscopic sleeve gastrectomy 2018  History sleeve 2018, preop weight was 436 pounds, he has gained all of his weight back, weight here today is 437 pounds with BMI 61.  he will start Wegovy plans to follow-up with bariatric group/nutritionist.    Recheck here roughly 3 months, update me in 2 to 3 weeks after starting medication       Testicular hypogonadism  He is using testosterone supplementation 3-4 times weekly, I refilled prescription, see previous evaluations.    He did see endocrinology also for adrenal gland/adenoma in the past, they plan to follow-up 2 years after last    Orders:    Testosterone 1.62 % GEL; USE 2 PUMP PRESSES DAILY      Patient Instructions       We did review previous blood work, he will do microalbumin here today, he will go for blood work at the lab.      Immunization History   Administered Date(s) Administered    COVID-19 MODERNA VACC 0.5 ML IM 01/23/2021, 02/20/2021, 10/18/2021    COVID-19 PFIZER VACCINE 0.3 ML IM 11/24/2021    INFLUENZA 02/15/2016, 10/16/2021    Influenza Quadrivalent Preservative Free 3 years and older IM 09/26/2014    Influenza Quadrivalent, 6-35 Months IM 02/15/2016    Influenza, injectable, quadrivalent, preservative free 0.5 mL 10/23/2020    Influenza, seasonal, injectable 10/11/2013    Tdap 10/23/2020    Tuberculin Skin Test-PPD Intradermal 04/01/2011       He should do yearly Flu shot.   Tdap/tetanus shot is up to date  (done every 10 yrs for superficial  "cuts, every 5 yrs for deep wounds)  Covid vaccine prev rcvd-   he is considering booster    Non-smoker     Regarding Colon Cancer screening, he did have a telemedicine visit with bariatric group to set up colon cancer screening/colonoscopy, he has not set that up yet, plans to do it, is overdue.  Discussed Prostate Cancer screening pros/cons starting at age 50.  PSA blood test  ordered.    Continue to try to watch healthy diet, exercise routinely.                 History of Present Illness     He is in for a follow-up visit/regular physical, I had seen him in August.  He relates he was unable to get Wegovy, is interested in starting it.  His weight is back to pretty bariatric sleeve weight, was to 436 pounds back in 2018.        Review of Systems   Constitutional:         He has had no fevers or chills, no respiratory illnesses.    No chest pain, no increased palpitations, lightheadedness, nor shortness of breath.  No increased headaches, no change in bowel or bladder, is in the process of setting up a screening colonoscopy.    He is using CPAP unit, nithya coffey, is looking into replacing that.    No issues with gout recently.    Uses testosterone 3-4 times weekly.            Objective   /72 (BP Location: Left arm, Patient Position: Sitting, Cuff Size: Large)   Pulse 66   Temp 98.6 °F (37 °C) (Tympanic)   Resp 20   Ht 5' 10.5\" (1.791 m)   Wt (!) 198 kg (437 lb 6.4 oz)   SpO2 99%   BMI 61.87 kg/m²      Physical Exam  Constitutional:       General: He is not in acute distress.     Appearance: He is obese. He is not ill-appearing.   Eyes:      General: No scleral icterus.  Neck:      Vascular: No carotid bruit.   Cardiovascular:      Rate and Rhythm: Normal rate and regular rhythm.      Heart sounds: Normal heart sounds.   Pulmonary:      Effort: No respiratory distress.      Breath sounds: Normal breath sounds.   Abdominal:      Tenderness: There is no abdominal tenderness.   Musculoskeletal:      " Comments: No significant pitting edema   Skin:     Coloration: Skin is not jaundiced.   Neurological:      Mental Status: He is alert. Mental status is at baseline.   Psychiatric:         Behavior: Behavior normal.

## 2024-11-15 NOTE — PATIENT INSTRUCTIONS
We did review previous blood work, he will do microalbumin here today, he will go for blood work at the lab.      Immunization History   Administered Date(s) Administered    COVID-19 MODERNA VACC 0.5 ML IM 01/23/2021, 02/20/2021, 10/18/2021    COVID-19 PFIZER VACCINE 0.3 ML IM 11/24/2021    INFLUENZA 02/15/2016, 10/16/2021    Influenza Quadrivalent Preservative Free 3 years and older IM 09/26/2014    Influenza Quadrivalent, 6-35 Months IM 02/15/2016    Influenza, injectable, quadrivalent, preservative free 0.5 mL 10/23/2020    Influenza, seasonal, injectable 10/11/2013    Tdap 10/23/2020    Tuberculin Skin Test-PPD Intradermal 04/01/2011       He should do yearly Flu shot.   Tdap/tetanus shot is up to date  (done every 10 yrs for superficial cuts, every 5 yrs for deep wounds)  Covid vaccine prev rcvd-   he is considering booster    Non-smoker     Regarding Colon Cancer screening, he did have a telemedicine visit with bariatric group to set up colon cancer screening/colonoscopy, he has not set that up yet, plans to do it, is overdue.  Discussed Prostate Cancer screening pros/cons starting at age 50.  PSA blood test  ordered.    Continue to try to watch healthy diet, exercise routinely.

## 2024-11-15 NOTE — ASSESSMENT & PLAN NOTE
He is using testosterone supplementation 3-4 times weekly, I refilled prescription, see previous evaluations.    He did see endocrinology also for adrenal gland/adenoma in the past, they plan to follow-up 2 years after last    Orders:    Testosterone 1.62 % GEL; USE 2 PUMP PRESSES DAILY

## 2024-11-15 NOTE — ASSESSMENT & PLAN NOTE
Blood pressure today 128/72, continue to monitor, remains off medication.  Had echocardiogram in September 2023 that looked okay.

## 2024-11-15 NOTE — ASSESSMENT & PLAN NOTE
He is trying to use PAP unit routinely, needs a new strap.  Orders:    Semaglutide-Weight Management (Wegovy) 0.25 MG/0.5ML; Inject 0.25 mg under the skin weekly

## 2024-11-15 NOTE — ASSESSMENT & PLAN NOTE
A1c redone here today is the same as back in August at 7.7 -has not yet started Wegovy due to availability, I sent a new prescription [facility] his [facility], if he is unable to pick that up he should let me know.  He should let me know how he is doing with this after 2 doses, we will plan to send in next level dose at that time, continue to titrate up as tolerated.    If not covered I would like him to check if any other medication such as Mounjaro are covered.  Lab Results   Component Value Date    HGBA1C 7.7 (A) 11/15/2024       Blood pressure today 128/72, continue to monitor, remains off medication.  Had echocardiogram in September 2023 that looked okay.    Orders:    Albumin / creatinine urine ratio; Future    POCT hemoglobin A1c    Semaglutide-Weight Management (Wegovy) 0.25 MG/0.5ML; Inject 0.25 mg under the skin weekly    CBC

## 2024-11-15 NOTE — ASSESSMENT & PLAN NOTE
History sleeve 2018, preop weight was 436 pounds, he has gained all of his weight back, weight here today is 437 pounds with BMI 61.  he will start Wegovy plans to follow-up with bariatric group/nutritionist.    Recheck here roughly 3 months, update me in 2 to 3 weeks after starting medication  Orders:    Semaglutide-Weight Management (Wegovy) 0.25 MG/0.5ML; Inject 0.25 mg under the skin weekly

## 2024-11-17 ENCOUNTER — RESULTS FOLLOW-UP (OUTPATIENT)
Dept: FAMILY MEDICINE CLINIC | Facility: CLINIC | Age: 54
End: 2024-11-17

## 2025-02-12 ENCOUNTER — TELEPHONE (OUTPATIENT)
Age: 55
End: 2025-02-12

## 2025-02-12 NOTE — TELEPHONE ENCOUNTER
Patient needs to have labs uploaded to his chart. He was advised at his last visit he would need to get his labs done.    Please call patient when labs are uploaded in the system.    Thank you.

## 2025-02-12 NOTE — TELEPHONE ENCOUNTER
Call him back -   he had testing in Nov -  we will plan to do fingerstick A1C to check on sugars at his visit.  No need for tests prior to visit

## 2025-02-13 DIAGNOSIS — M1A.00X0 IDIOPATHIC CHRONIC GOUT WITHOUT TOPHUS, UNSPECIFIED SITE: ICD-10-CM

## 2025-02-13 RX ORDER — ALLOPURINOL 300 MG/1
300 TABLET ORAL DAILY
Qty: 90 TABLET | Refills: 1 | Status: SHIPPED | OUTPATIENT
Start: 2025-02-13

## 2025-02-20 ENCOUNTER — RA CDI HCC (OUTPATIENT)
Dept: OTHER | Facility: HOSPITAL | Age: 55
End: 2025-02-20

## 2025-02-21 ENCOUNTER — OFFICE VISIT (OUTPATIENT)
Dept: FAMILY MEDICINE CLINIC | Facility: CLINIC | Age: 55
End: 2025-02-21
Payer: COMMERCIAL

## 2025-02-21 VITALS
WEIGHT: 315 LBS | DIASTOLIC BLOOD PRESSURE: 90 MMHG | HEIGHT: 71 IN | BODY MASS INDEX: 44.1 KG/M2 | SYSTOLIC BLOOD PRESSURE: 124 MMHG | OXYGEN SATURATION: 98 % | RESPIRATION RATE: 14 BRPM | HEART RATE: 97 BPM

## 2025-02-21 DIAGNOSIS — G47.33 OBSTRUCTIVE SLEEP APNEA TREATED WITH CONTINUOUS POSITIVE AIRWAY PRESSURE (CPAP): ICD-10-CM

## 2025-02-21 DIAGNOSIS — E66.813 CLASS 3 SEVERE OBESITY DUE TO EXCESS CALORIES WITH SERIOUS COMORBIDITY AND BODY MASS INDEX (BMI) OF 60.0 TO 69.9 IN ADULT (HCC): ICD-10-CM

## 2025-02-21 DIAGNOSIS — Z12.11 COLON CANCER SCREENING: ICD-10-CM

## 2025-02-21 DIAGNOSIS — Z98.84 S/P LAPAROSCOPIC SLEEVE GASTRECTOMY: ICD-10-CM

## 2025-02-21 DIAGNOSIS — E11.9 TYPE 2 DIABETES MELLITUS WITHOUT COMPLICATION, WITHOUT LONG-TERM CURRENT USE OF INSULIN (HCC): Primary | ICD-10-CM

## 2025-02-21 DIAGNOSIS — E66.01 CLASS 3 SEVERE OBESITY DUE TO EXCESS CALORIES WITH SERIOUS COMORBIDITY AND BODY MASS INDEX (BMI) OF 60.0 TO 69.9 IN ADULT (HCC): ICD-10-CM

## 2025-02-21 PROCEDURE — 99212 OFFICE O/P EST SF 10 MIN: CPT | Performed by: FAMILY MEDICINE

## 2025-02-21 RX ORDER — TIRZEPATIDE 5 MG/.5ML
5 INJECTION, SOLUTION SUBCUTANEOUS WEEKLY
Qty: 2 ML | Refills: 0 | Status: SHIPPED | OUTPATIENT
Start: 2025-02-21

## 2025-02-21 RX ORDER — TIRZEPATIDE 2.5 MG/.5ML
2.5 INJECTION, SOLUTION SUBCUTANEOUS WEEKLY
Qty: 2 ML | Refills: 0 | Status: SHIPPED | OUTPATIENT
Start: 2025-02-21 | End: 2025-03-21

## 2025-02-21 NOTE — ASSESSMENT & PLAN NOTE
Orders:    tirzepatide (Zepbound) 2.5 mg/0.5 mL auto-injector; Inject 0.5 mL (2.5 mg total) under the skin once a week for 28 days    tirzepatide (Zepbound) 5 mg/0.5 mL auto-injector; Inject 0.5 mL (5 mg total) under the skin once a week

## 2025-02-21 NOTE — ASSESSMENT & PLAN NOTE
We did review history, see full physical back in November, he was unable to get Wegovy due to coverage.  He does have BMI over 60, documented sleep apnea, diabetes with A1c of 7.7 back in November.  After discussion he will start Zepbound 2.5 weekly for 4 weeks, at that time he will increase to 5 mg weekly.  Recheck with provider 6 weeks after starting.    Continue to work on healthy diet, routine exercise as knees allow.  See previous history regarding bariatric surgery.    At follow-up we will plan to redo A1c.  Orders:    tirzepatide (Zepbound) 2.5 mg/0.5 mL auto-injector; Inject 0.5 mL (2.5 mg total) under the skin once a week for 28 days    tirzepatide (Zepbound) 5 mg/0.5 mL auto-injector; Inject 0.5 mL (5 mg total) under the skin once a week

## 2025-02-21 NOTE — PROGRESS NOTES
Name: Sammy Goldberg      : 1970      MRN: [id number]  Encounter Provider: Moreno Dougherty DO  Encounter Date: 2025   Encounter department: [facility]  :  Assessment & Plan  Type 2 diabetes mellitus without complication, without long-term current use of insulin ([facility])  We did review history, see full physical back in November, he was unable to get Wegovy due to coverage.  He does have BMI over 60, documented sleep apnea, diabetes with A1c of 7.7 back in November.  After discussion he will start Zepbound 2.5 weekly for 4 weeks, at that time he will increase to 5 mg weekly.  Recheck with provider 6 weeks after starting.    Continue to work on healthy diet, routine exercise as knees allow.  See previous history regarding bariatric surgery.    At follow-up we will plan to redo A1c.  Orders:    tirzepatide (Zepbound) 2.5 mg/0.5 mL auto-injector; Inject 0.5 mL (2.5 mg total) under the skin once a week for 28 days    tirzepatide (Zepbound) 5 mg/0.5 mL auto-injector; Inject 0.5 mL (5 mg total) under the skin once a week    Obstructive sleep apnea treated with continuous positive airway pressure (CPAP)    Orders:    tirzepatide (Zepbound) 2.5 mg/0.5 mL auto-injector; Inject 0.5 mL (2.5 mg total) under the skin once a week for 28 days    tirzepatide (Zepbound) 5 mg/0.5 mL auto-injector; Inject 0.5 mL (5 mg total) under the skin once a week    Class 3 severe obesity due to excess calories with serious comorbidity and body mass index (BMI) of 60.0 to 69.9 in adult (HCC)      Orders:    tirzepatide (Zepbound) 2.5 mg/0.5 mL auto-injector; Inject 0.5 mL (2.5 mg total) under the skin once a week for 28 days    tirzepatide (Zepbound) 5 mg/0.5 mL auto-injector; Inject 0.5 mL (5 mg total) under the skin once a week    Colon cancer screening  Far overdue for screening, family history colon cancer with parent, referral placed for colonoscopy screening.    Otherwise treatment as discussed at  visit in November.  Orders:    Ambulatory Referral to Gastroenterology; Future    S/P laparoscopic sleeve gastrectomy 2018                History of Present Illness   In for recheck, see full physical back in November.  Feels about the same, was unable to start Wegovy due to cost      Review of Systems   Constitutional:  Positive for fatigue. Negative for appetite change, fever and unexpected weight change.   HENT:  Negative for sore throat and trouble swallowing.    Respiratory:  Negative for cough, chest tightness, shortness of breath and wheezing.    Cardiovascular:  Negative for chest pain, palpitations and leg swelling.   Gastrointestinal:  Negative for abdominal pain, blood in stool, nausea and vomiting.        No acid reflux     No change in bowel   Genitourinary:  Negative for dysuria and hematuria.   Neurological:  Negative for dizziness, syncope, light-headedness and headaches.   Psychiatric/Behavioral:  Negative for behavioral problems and confusion.          Current Outpatient Medications:     allopurinol (ZYLOPRIM) 300 mg tablet, TAKE 1 TABLET BY MOUTH EVERY DAY, Disp: 90 tablet, Rfl: 1    atorvastatin (LIPITOR) 10 mg tablet, Take 1 tablet (10 mg total) by mouth daily, Disp: 90 tablet, Rfl: 3    B Complex Vitamins (VITAMIN B COMPLEX PO), Take 1 tablet by mouth, Disp: , Rfl:     Calcium Carb-Cholecalciferol (CALCIUM CARBONATE-VITAMIN D3 PO), Take 1 tablet by mouth, Disp: , Rfl:     Cholecalciferol (Vitamin D) 50 MCG (2000 UT) tablet, Take 1 tablet (2,000 Units total) by mouth daily, Disp: 30 tablet, Rfl: 11    Coenzyme Q10 (COQ10 PO), Take by mouth every other day Liquid, Disp: , Rfl:     Multiple Vitamin (MULTI-VITAMIN DAILY) TABS, Take 1 tablet by mouth daily, Disp: , Rfl:     NON FORMULARY, in the morning Turkey Tail supplement for pain, Disp: , Rfl:     Omega-3 Fatty Acids (fish oil) 1,000 mg, Take 1,000 mg by mouth daily, Disp: , Rfl:     Testosterone 1.62 % GEL, USE 2 PUMP PRESSES DAILY (Patient  "taking differently: 2 pumps 3 x a week), Disp: 75 g, Rfl: 1    tirzepatide (Zepbound) 2.5 mg/0.5 mL auto-injector, Inject 0.5 mL (2.5 mg total) under the skin once a week for 28 days, Disp: 2 mL, Rfl: 0    tirzepatide (Zepbound) 5 mg/0.5 mL auto-injector, Inject 0.5 mL (5 mg total) under the skin once a week, Disp: 2 mL, Rfl: 0    Turmeric 1053 MG TABS, Take by mouth, Disp: , Rfl:     vitamin B-12 (VITAMIN B-12) 1,000 mcg tablet, Take 1,000 mcg by mouth daily, Disp: , Rfl:     colchicine (COLCRYS) 0.6 mg tablet, TAKE 1 TABLET (0.6 MG TOTAL) BY MOUTH 2 (TWO) TIMES A DAY AS NEEDED (GOUT PAIN) (Patient not taking: Reported on 2/21/2025), Disp: 20 tablet, Rfl: 0     Objective   /90 (BP Location: Left arm, Patient Position: Sitting, Cuff Size: Standard)   Pulse 97   Resp 14   Ht 5' 10.5\" (1.791 m)   Wt (!) 196 kg (431 lb 9.6 oz)   SpO2 98%   BMI 61.05 kg/m²      Physical Exam  Constitutional:       General: He is not in acute distress.     Appearance: He is obese.   Neurological:      Mental Status: He is alert.         "

## 2025-02-27 ENCOUNTER — TELEPHONE (OUTPATIENT)
Age: 55
End: 2025-02-27

## 2025-02-27 NOTE — TELEPHONE ENCOUNTER
PA for Zepbound 2.5mg/0.5mL SUBMITTED to [employer]    via    []CMM-KEY:   [x]Surescripts  []Availity-Auth ID # NDC #   []Faxed to plan   []Other website   []Phone call Case ID #     [x]PA sent as URGENT    All office notes, labs and other pertaining documents and studies sent. Clinical questions answered. Awaiting determination from insurance company.     Turnaround time for your insurance to make a decision on your Prior Authorization can take 7-21 business days.

## 2025-02-28 NOTE — TELEPHONE ENCOUNTER
PA for Zepbound 2.5mg/0.5mL CANCELLED due to     []Approval on file-dates approved   [x]Medication already on Formulary  []Brand Name Preferred  []Patient no longer covered by insurance        Scanned into Media  yes

## 2025-08-21 DIAGNOSIS — E78.2 MIXED HYPERLIPIDEMIA: ICD-10-CM

## 2025-08-21 RX ORDER — ATORVASTATIN CALCIUM 10 MG/1
10 TABLET, FILM COATED ORAL DAILY
Qty: 90 TABLET | Refills: 3 | Status: CANCELLED | OUTPATIENT
Start: 2025-08-21